# Patient Record
Sex: FEMALE | Race: BLACK OR AFRICAN AMERICAN | NOT HISPANIC OR LATINO | Employment: FULL TIME | URBAN - METROPOLITAN AREA
[De-identification: names, ages, dates, MRNs, and addresses within clinical notes are randomized per-mention and may not be internally consistent; named-entity substitution may affect disease eponyms.]

---

## 2018-02-08 ENCOUNTER — OFFICE VISIT (OUTPATIENT)
Dept: INTERNAL MEDICINE | Facility: CLINIC | Age: 33
End: 2018-02-08
Payer: COMMERCIAL

## 2018-02-08 ENCOUNTER — LAB VISIT (OUTPATIENT)
Dept: LAB | Facility: HOSPITAL | Age: 33
End: 2018-02-08
Payer: COMMERCIAL

## 2018-02-08 VITALS
WEIGHT: 293 LBS | HEART RATE: 82 BPM | HEIGHT: 63 IN | DIASTOLIC BLOOD PRESSURE: 100 MMHG | BODY MASS INDEX: 51.91 KG/M2 | SYSTOLIC BLOOD PRESSURE: 160 MMHG

## 2018-02-08 DIAGNOSIS — Z86.39 HISTORY OF DIABETES MELLITUS, TYPE II: ICD-10-CM

## 2018-02-08 DIAGNOSIS — I10 ESSENTIAL HYPERTENSION: ICD-10-CM

## 2018-02-08 DIAGNOSIS — E88.810 METABOLIC SYNDROME: Primary | ICD-10-CM

## 2018-02-08 DIAGNOSIS — E66.9 SUPER OBESITY: ICD-10-CM

## 2018-02-08 DIAGNOSIS — E88.810 METABOLIC SYNDROME: ICD-10-CM

## 2018-02-08 LAB
ALBUMIN SERPL BCP-MCNC: 3 G/DL
ALP SERPL-CCNC: 124 U/L
ALT SERPL W/O P-5'-P-CCNC: 10 U/L
ANION GAP SERPL CALC-SCNC: 7 MMOL/L
AST SERPL-CCNC: 15 U/L
BILIRUB SERPL-MCNC: 0.4 MG/DL
BUN SERPL-MCNC: 13 MG/DL
CALCIUM SERPL-MCNC: 9.5 MG/DL
CHLORIDE SERPL-SCNC: 104 MMOL/L
CHOLEST SERPL-MCNC: 183 MG/DL
CHOLEST/HDLC SERPL: 4.7 {RATIO}
CO2 SERPL-SCNC: 27 MMOL/L
CREAT SERPL-MCNC: 0.8 MG/DL
EST. GFR  (AFRICAN AMERICAN): >60 ML/MIN/1.73 M^2
EST. GFR  (NON AFRICAN AMERICAN): >60 ML/MIN/1.73 M^2
ESTIMATED AVG GLUCOSE: 177 MG/DL
GLUCOSE SERPL-MCNC: 147 MG/DL
HBA1C MFR BLD HPLC: 7.8 %
HDLC SERPL-MCNC: 39 MG/DL
HDLC SERPL: 21.3 %
LDLC SERPL CALC-MCNC: 120.8 MG/DL
NONHDLC SERPL-MCNC: 144 MG/DL
POTASSIUM SERPL-SCNC: 4.4 MMOL/L
PROT SERPL-MCNC: 7.7 G/DL
SODIUM SERPL-SCNC: 138 MMOL/L
TRIGL SERPL-MCNC: 116 MG/DL
TSH SERPL DL<=0.005 MIU/L-ACNC: 2.6 UIU/ML

## 2018-02-08 PROCEDURE — 99999 PR PBB SHADOW E&M-EST. PATIENT-LVL III: CPT | Mod: PBBFAC,,, | Performed by: STUDENT IN AN ORGANIZED HEALTH CARE EDUCATION/TRAINING PROGRAM

## 2018-02-08 PROCEDURE — 80053 COMPREHEN METABOLIC PANEL: CPT

## 2018-02-08 PROCEDURE — 80061 LIPID PANEL: CPT

## 2018-02-08 PROCEDURE — 83036 HEMOGLOBIN GLYCOSYLATED A1C: CPT

## 2018-02-08 PROCEDURE — 84443 ASSAY THYROID STIM HORMONE: CPT

## 2018-02-08 PROCEDURE — 36415 COLL VENOUS BLD VENIPUNCTURE: CPT

## 2018-02-08 PROCEDURE — 3008F BODY MASS INDEX DOCD: CPT | Mod: S$GLB,,, | Performed by: STUDENT IN AN ORGANIZED HEALTH CARE EDUCATION/TRAINING PROGRAM

## 2018-02-08 PROCEDURE — 99213 OFFICE O/P EST LOW 20 MIN: CPT | Performed by: STUDENT IN AN ORGANIZED HEALTH CARE EDUCATION/TRAINING PROGRAM

## 2018-02-08 PROCEDURE — 99204 OFFICE O/P NEW MOD 45 MIN: CPT | Mod: S$GLB,,, | Performed by: STUDENT IN AN ORGANIZED HEALTH CARE EDUCATION/TRAINING PROGRAM

## 2018-02-08 RX ORDER — AMLODIPINE BESYLATE 10 MG/1
TABLET ORAL
Qty: 30 TABLET | Refills: 11 | Status: SHIPPED | OUTPATIENT
Start: 2018-02-08 | End: 2019-03-21 | Stop reason: SDUPTHER

## 2018-02-08 RX ORDER — GLIMEPIRIDE 1 MG/1
TABLET ORAL
Qty: 30 TABLET | Refills: 11 | Status: SHIPPED | OUTPATIENT
Start: 2018-02-08 | End: 2019-03-21

## 2018-02-08 NOTE — PROGRESS NOTES
Subjective     Chief Complaint: Establish care    History of Present Illness:    Ms. Marlyn Kim is a 32 y.o. female who is here to establish care. She has HTN diagnosed at 17 years after having gestational HTN. Has been on amlodipine for few years. BP is elevated 160/100 in clinic today. States she has been out of her meds for the past week. Denies chest pain, HA, sob, palpitations, syncope.     She has DM II was diagnosed 1.5 years ago, on metformin 500 mg, BID and Amaryl 1 mg once daily. Does not check BP at home. Per patient last A1c level was 8.0. Will get labs today. Has no blurry vision, numbness/neuropathy.     Patient is super morbidly obese with BMI 52.88. With HTN and DM II meets criteria for metabolic syndrome. High risk for CVD. She has a family history of HTN, DM, heart disease from her mother, DM II from her sister at age 46 and HTN from brother who is 38. Past surgical history includes cholecystectomy and 2 c-sections. She has 5 children. 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 Patient does not smoke, occasional drinks alcohol.    Review of Systems   Constitutional: Negative for chills and fever.   HENT: Negative for congestion, sinus pain and sore throat.    Eyes: Negative for blurred vision and double vision.   Respiratory: Negative for cough, shortness of breath and wheezing.    Cardiovascular: Negative for chest pain, palpitations and leg swelling.   Gastrointestinal: Negative for abdominal pain, constipation and diarrhea.   Genitourinary: Negative for frequency, hematuria and urgency.   Musculoskeletal: Negative for back pain, myalgias and neck pain.   Skin: Negative for itching and rash.   Neurological: Negative for dizziness, focal weakness, seizures, weakness and headaches.   Psychiatric/Behavioral: Negative for depression and substance abuse.       PAST HISTORY:     Past Medical History:   Diagnosis Date    Hypertension        Past Surgical History:   Procedure Laterality Date     " SECTION      CHOLECYSTECTOMY      TUBAL LIGATION Bilateral 2015       Family History   Problem Relation Age of Onset    Diabetes Mother     Heart disease Mother     Hypertension Mother     Cancer Neg Hx     Stroke Neg Hx        Social History     Social History    Marital status:      Spouse name: N/A    Number of children: N/A    Years of education: N/A     Social History Main Topics    Smoking status: Never Smoker    Smokeless tobacco: None    Alcohol use Yes    Drug use: Unknown    Sexual activity: Yes     Partners: Male     Birth control/ protection: Surgical     Other Topics Concern    None     Social History Narrative    None       MEDICATIONS & ALLERGIES:     Current Outpatient Prescriptions on File Prior to Visit   Medication Sig    meloxicam (MOBIC) 7.5 MG tablet Take one tablet after eating twice a day for pain.    metformin (GLUCOPHAGE-XR) 500 MG 24 hr tablet TK 2 TS PO QD.    [DISCONTINUED] amlodipine (NORVASC) 10 MG tablet TK ONE T PO QD.    [DISCONTINUED] glimepiride (AMARYL) 1 MG tablet TK 1 T PO QD     Current Facility-Administered Medications on File Prior to Visit   Medication    ibuprofen tablet 600 mg       Review of patient's allergies indicates:   Allergen Reactions    Ciprofloxacin        OBJECTIVE:     Vital Signs:  Vitals:    18 1318   BP: (!) 160/100   BP Location: Left arm   Patient Position: Sitting   BP Method: Large (Manual)   Pulse: 82   Weight: 135.4 kg (298 lb 8.1 oz)   Height: 5' 3" (1.6 m)       Body mass index is 52.88 kg/m².     Physical Exam:  General:  Well developed, well nourished, no acute distress  Head: Normocephalic, atraumatic  Eyes: PERRL, EOMI, clear sclera  Throat: No posterior pharyngeal erythema or exudate, no tonsillar exudate  Neck: supple, normal ROM, no thyromegaly   CVS:  RRR, S1 and S2 normal, no murmurs, rubs, gallops, 2+ peripheral pulses  Resp:  Lungs clear to auscultation, no wheezes, rales, rhonchi, " cough  GI:  Abdomen soft, non-tender, non-distended, normoactive bowel sounds  MSK:  No muscle atrophy, cyanosis, peripheral edema   Skin:  No rashes, ulcers, erythema  Neuro:  CNII-XII grossly intact, no focal deficits noted  Psych:  Appropriate mood and affect, normal judgement    Laboratory  Lab Results   Component Value Date    WBC 9.97 06/05/2015    HGB 12.2 06/05/2015    HCT 36.3 (L) 06/05/2015    MCV 67 (L) 06/05/2015     (H) 06/05/2015     @[id number](GLU,NA,K,Cl,CO2,BUN,Creatinine,Calcium,MG)@  Lab Results   Component Value Date    INR 0.9 06/06/2012     No results found for: HGBA1C  No results for input(s): POCTGLUCOSE in the last 72 hours.    Diagnostic Results:  None    ASSESSMENT & PLAN:   Ms. Marlyn Kim is a 32 y.o. female    Metabolic syndrome: HTN, DM II, super morbid obesity meets criteria for metabolic syndrome. Increased risk for CVD. Family hx of CVD.   -     Hemoglobin A1c; Future; Expected date: 02/08/2018  -     Comprehensive metabolic panel; Future; Expected date: 02/08/2018  -     Lipid panel; Future; Expected date: 02/08/2018  -     TSH; Future; Expected date: 02/08/2018    Essential hypertension: Morbidly obese female with HTN. Also consider secondary HTN. Will check TSH for now. Will consider renal US if indicated with follow up visit.   -     amLODIPine (NORVASC) 10 MG tablet; TK ONE T PO QD.  Dispense: 30 tablet; Refill: 11  -     Comprehensive metabolic panel; Future; Expected date: 02/08/2018    Super obesity:  -     Hemoglobin A1c; Future; Expected date: 02/08/2018  -     Comprehensive metabolic panel; Future; Expected date: 02/08/2018  -     Lipid panel; Future; Expected date: 02/08/2018  -     TSH; Future; Expected date: 02/08/2018    History of diabetes mellitus, type II  -     glimepiride (AMARYL) 1 MG tablet; TK 1 T PO QD  Dispense: 30 tablet; Refill: 11  -     Hemoglobin A1c; Future; Expected date: 02/08/2018  -     Lipid panel; Future; Expected date:  02/08/2018    RTC in 6 weeks    Discussed with Dr. France  - staff attestation to follow

## 2018-02-09 RX ORDER — ATORVASTATIN CALCIUM 40 MG/1
40 TABLET, FILM COATED ORAL DAILY
Qty: 90 TABLET | Refills: 3 | Status: SHIPPED | OUTPATIENT
Start: 2018-02-09 | End: 2019-03-21 | Stop reason: SDUPTHER

## 2018-04-05 RX ORDER — METFORMIN HYDROCHLORIDE 500 MG/1
TABLET, EXTENDED RELEASE ORAL
Qty: 60 TABLET | Refills: 0 | Status: CANCELLED | OUTPATIENT
Start: 2018-04-05

## 2018-04-09 RX ORDER — METFORMIN HYDROCHLORIDE 500 MG/1
1000 TABLET, EXTENDED RELEASE ORAL DAILY
Qty: 180 TABLET | Refills: 11 | Status: SHIPPED | OUTPATIENT
Start: 2018-04-09 | End: 2019-03-21 | Stop reason: SDUPTHER

## 2019-02-11 DIAGNOSIS — I10 ESSENTIAL HYPERTENSION: ICD-10-CM

## 2019-02-11 DIAGNOSIS — Z86.39 HISTORY OF DIABETES MELLITUS, TYPE II: ICD-10-CM

## 2019-02-11 RX ORDER — AMLODIPINE BESYLATE 10 MG/1
TABLET ORAL
Qty: 30 TABLET | Refills: 0 | Status: CANCELLED | OUTPATIENT
Start: 2019-02-11

## 2019-03-13 ENCOUNTER — TELEPHONE (OUTPATIENT)
Dept: INTERNAL MEDICINE | Facility: CLINIC | Age: 34
End: 2019-03-13

## 2019-03-13 DIAGNOSIS — Z00.00 ANNUAL PHYSICAL EXAM: Primary | ICD-10-CM

## 2019-03-13 NOTE — TELEPHONE ENCOUNTER
----- Message from Morenita Tafoya MA sent at 3/13/2019  1:13 PM CDT -----  Contact: Patient   Please advise    ----- Message -----  From: Carlos Saleh  Sent: 3/13/2019  12:52 PM  To: [facility] Staff    03/21/19 Annual Physical need lab orders placed and linked   patient of Dr Zepeda    Please call an advise  Thank you

## 2019-03-13 NOTE — TELEPHONE ENCOUNTER
Labs ordered please link and have pt do 2-3 days prior to appt so we can review results at appt thanks

## 2019-03-18 ENCOUNTER — TELEPHONE (OUTPATIENT)
Dept: INTERNAL MEDICINE | Facility: CLINIC | Age: 34
End: 2019-03-18

## 2019-03-18 ENCOUNTER — LAB VISIT (OUTPATIENT)
Dept: LAB | Facility: HOSPITAL | Age: 34
End: 2019-03-18
Payer: COMMERCIAL

## 2019-03-18 DIAGNOSIS — Z00.00 ANNUAL PHYSICAL EXAM: ICD-10-CM

## 2019-03-18 LAB
ALBUMIN SERPL BCP-MCNC: 3.2 G/DL
ALP SERPL-CCNC: 115 U/L
ALT SERPL W/O P-5'-P-CCNC: 11 U/L
ANION GAP SERPL CALC-SCNC: 10 MMOL/L
ANISOCYTOSIS BLD QL SMEAR: SLIGHT
AST SERPL-CCNC: 15 U/L
BASOPHILS # BLD AUTO: 0.02 K/UL
BASOPHILS NFR BLD: 0.2 %
BILIRUB SERPL-MCNC: 0.4 MG/DL
BUN SERPL-MCNC: 12 MG/DL
CALCIUM SERPL-MCNC: 9.3 MG/DL
CHLORIDE SERPL-SCNC: 102 MMOL/L
CHOLEST SERPL-MCNC: 183 MG/DL
CHOLEST/HDLC SERPL: 4.7 {RATIO}
CO2 SERPL-SCNC: 23 MMOL/L
CREAT SERPL-MCNC: 0.9 MG/DL
DIFFERENTIAL METHOD: ABNORMAL
EOSINOPHIL # BLD AUTO: 0.1 K/UL
EOSINOPHIL NFR BLD: 1.4 %
ERYTHROCYTE [DISTWIDTH] IN BLOOD BY AUTOMATED COUNT: 17.4 %
EST. GFR  (AFRICAN AMERICAN): >60 ML/MIN/1.73 M^2
EST. GFR  (NON AFRICAN AMERICAN): >60 ML/MIN/1.73 M^2
ESTIMATED AVG GLUCOSE: 240 MG/DL
GLUCOSE SERPL-MCNC: 274 MG/DL
HBA1C MFR BLD HPLC: 10 %
HCT VFR BLD AUTO: 32.4 %
HDLC SERPL-MCNC: 39 MG/DL
HDLC SERPL: 21.3 %
HGB BLD-MCNC: 10.3 G/DL
LDLC SERPL CALC-MCNC: 120.2 MG/DL
LYMPHOCYTES # BLD AUTO: 2.3 K/UL
LYMPHOCYTES NFR BLD: 26.3 %
MCH RBC QN AUTO: 21.3 PG
MCHC RBC AUTO-ENTMCNC: 31.8 G/DL
MCV RBC AUTO: 67 FL
MONOCYTES # BLD AUTO: 0.5 K/UL
MONOCYTES NFR BLD: 5.9 %
NEUTROPHILS # BLD AUTO: 5.8 K/UL
NEUTROPHILS NFR BLD: 66.2 %
NONHDLC SERPL-MCNC: 144 MG/DL
PLATELET # BLD AUTO: 379 K/UL
PMV BLD AUTO: 9.4 FL
POTASSIUM SERPL-SCNC: 3.9 MMOL/L
PROT SERPL-MCNC: 7.5 G/DL
RBC # BLD AUTO: 4.83 M/UL
SODIUM SERPL-SCNC: 135 MMOL/L
TRIGL SERPL-MCNC: 119 MG/DL
TSH SERPL DL<=0.005 MIU/L-ACNC: 2.83 UIU/ML
WBC # BLD AUTO: 8.74 K/UL

## 2019-03-18 PROCEDURE — 80053 COMPREHEN METABOLIC PANEL: CPT

## 2019-03-18 PROCEDURE — 85025 COMPLETE CBC W/AUTO DIFF WBC: CPT

## 2019-03-18 PROCEDURE — 80061 LIPID PANEL: CPT

## 2019-03-18 PROCEDURE — 83036 HEMOGLOBIN GLYCOSYLATED A1C: CPT

## 2019-03-18 PROCEDURE — 36415 COLL VENOUS BLD VENIPUNCTURE: CPT

## 2019-03-18 PROCEDURE — 84443 ASSAY THYROID STIM HORMONE: CPT

## 2019-03-21 ENCOUNTER — OFFICE VISIT (OUTPATIENT)
Dept: INTERNAL MEDICINE | Facility: CLINIC | Age: 34
End: 2019-03-21
Payer: COMMERCIAL

## 2019-03-21 ENCOUNTER — PATIENT MESSAGE (OUTPATIENT)
Dept: ADMINISTRATIVE | Facility: OTHER | Age: 34
End: 2019-03-21

## 2019-03-21 VITALS
BODY MASS INDEX: 51.91 KG/M2 | DIASTOLIC BLOOD PRESSURE: 110 MMHG | SYSTOLIC BLOOD PRESSURE: 160 MMHG | OXYGEN SATURATION: 99 % | WEIGHT: 293 LBS | HEART RATE: 91 BPM | TEMPERATURE: 98 F | HEIGHT: 63 IN

## 2019-03-21 DIAGNOSIS — I10 ESSENTIAL HYPERTENSION: ICD-10-CM

## 2019-03-21 DIAGNOSIS — E66.9 SUPER OBESITY: ICD-10-CM

## 2019-03-21 DIAGNOSIS — E11.8 TYPE 2 DIABETES MELLITUS WITH COMPLICATION, WITH LONG-TERM CURRENT USE OF INSULIN: Primary | ICD-10-CM

## 2019-03-21 DIAGNOSIS — I15.2 HYPERTENSION ASSOCIATED WITH DIABETES: ICD-10-CM

## 2019-03-21 DIAGNOSIS — E11.59 HYPERTENSION ASSOCIATED WITH DIABETES: ICD-10-CM

## 2019-03-21 DIAGNOSIS — Z79.4 TYPE 2 DIABETES MELLITUS WITH COMPLICATION, WITH LONG-TERM CURRENT USE OF INSULIN: Primary | ICD-10-CM

## 2019-03-21 DIAGNOSIS — E11.65 UNCONTROLLED TYPE 2 DIABETES MELLITUS WITH HYPERGLYCEMIA: ICD-10-CM

## 2019-03-21 PROBLEM — K02.9 CARIES: Status: ACTIVE | Noted: 2018-03-05

## 2019-03-21 PROCEDURE — 3046F HEMOGLOBIN A1C LEVEL >9.0%: CPT | Mod: CPTII,S$GLB,, | Performed by: NURSE PRACTITIONER

## 2019-03-21 PROCEDURE — 99999 PR PBB SHADOW E&M-EST. PATIENT-LVL V: CPT | Mod: PBBFAC,,, | Performed by: NURSE PRACTITIONER

## 2019-03-21 PROCEDURE — 99214 OFFICE O/P EST MOD 30 MIN: CPT | Mod: S$GLB,,, | Performed by: NURSE PRACTITIONER

## 2019-03-21 PROCEDURE — 3008F PR BODY MASS INDEX (BMI) DOCUMENTED: ICD-10-PCS | Mod: CPTII,S$GLB,, | Performed by: NURSE PRACTITIONER

## 2019-03-21 PROCEDURE — 3077F SYST BP >= 140 MM HG: CPT | Mod: CPTII,S$GLB,, | Performed by: NURSE PRACTITIONER

## 2019-03-21 PROCEDURE — 99214 PR OFFICE/OUTPT VISIT, EST, LEVL IV, 30-39 MIN: ICD-10-PCS | Mod: S$GLB,,, | Performed by: NURSE PRACTITIONER

## 2019-03-21 PROCEDURE — 3046F PR MOST RECENT HEMOGLOBIN A1C LEVEL > 9.0%: ICD-10-PCS | Mod: CPTII,S$GLB,, | Performed by: NURSE PRACTITIONER

## 2019-03-21 PROCEDURE — 3077F PR MOST RECENT SYSTOLIC BLOOD PRESSURE >= 140 MM HG: ICD-10-PCS | Mod: CPTII,S$GLB,, | Performed by: NURSE PRACTITIONER

## 2019-03-21 PROCEDURE — 3080F DIAST BP >= 90 MM HG: CPT | Mod: CPTII,S$GLB,, | Performed by: NURSE PRACTITIONER

## 2019-03-21 PROCEDURE — 99999 PR PBB SHADOW E&M-EST. PATIENT-LVL V: ICD-10-PCS | Mod: PBBFAC,,, | Performed by: NURSE PRACTITIONER

## 2019-03-21 PROCEDURE — 3008F BODY MASS INDEX DOCD: CPT | Mod: CPTII,S$GLB,, | Performed by: NURSE PRACTITIONER

## 2019-03-21 PROCEDURE — 3080F PR MOST RECENT DIASTOLIC BLOOD PRESSURE >= 90 MM HG: ICD-10-PCS | Mod: CPTII,S$GLB,, | Performed by: NURSE PRACTITIONER

## 2019-03-21 RX ORDER — AMLODIPINE BESYLATE 10 MG/1
TABLET ORAL
Qty: 30 TABLET | Refills: 11 | Status: SHIPPED | OUTPATIENT
Start: 2019-03-21 | End: 2020-02-20

## 2019-03-21 RX ORDER — METFORMIN HYDROCHLORIDE 500 MG/1
1000 TABLET, EXTENDED RELEASE ORAL DAILY
Qty: 180 TABLET | Refills: 11 | Status: SHIPPED | OUTPATIENT
Start: 2019-03-21 | End: 2020-04-06 | Stop reason: SDUPTHER

## 2019-03-21 RX ORDER — LOSARTAN POTASSIUM 50 MG/1
50 TABLET ORAL DAILY
Qty: 90 TABLET | Refills: 3 | Status: SHIPPED | OUTPATIENT
Start: 2019-03-21 | End: 2020-02-20

## 2019-03-21 RX ORDER — GLIPIZIDE 10 MG/1
10 TABLET, FILM COATED, EXTENDED RELEASE ORAL
COMMUNITY
End: 2019-05-17

## 2019-03-21 RX ORDER — INSULIN PUMP SYRINGE, 3 ML
EACH MISCELLANEOUS
Qty: 1 EACH | Refills: 0 | Status: SHIPPED | OUTPATIENT
Start: 2019-03-21 | End: 2021-02-11 | Stop reason: SDUPTHER

## 2019-03-21 RX ORDER — ATORVASTATIN CALCIUM 40 MG/1
40 TABLET, FILM COATED ORAL DAILY
Qty: 90 TABLET | Refills: 3 | Status: SHIPPED | OUTPATIENT
Start: 2019-03-21 | End: 2019-11-19

## 2019-03-21 RX ORDER — LANCETS 28 GAUGE
EACH MISCELLANEOUS
Qty: 100 EACH | Refills: 6 | Status: SHIPPED | OUTPATIENT
Start: 2019-03-21 | End: 2020-04-06 | Stop reason: SDUPTHER

## 2019-03-21 NOTE — MEDICAL/APP STUDENT
Subjective:       Patient ID: Marlyn Kim is a 33 y.o. female.    Chief Complaint: Annual Exam      I have reviewed the notes, assessments, and/or procedures performed by SUNITA student , I concur with her/his documentation of Marlyn Kim.    Pt seen in conjunction with NP student    Patient presents today for her annual exam. Pt has no complaints today.   She admits to not monitoring her blood sugar and blood pressures like she should. States that  She doesn't like the needles to check her blood sugar with. She also has been out of her blood pressure medication and diabetic medication for the past few months. She states that she never began the atorvastatin because the pharmacy never called that they received the prescription. Reports that she does not drink enough water, mostly drinks soda. She works night shift.   Eye exam within the last year. Pt has never been to the podiatrist. Last dental exam more than a few years. Last pap smear two years ago. She did get a influenza vaccine through work, she works for [employer]. Unsure of last Tdap injection date, not interested in getting today. Pt has never had a pneumonia vaccine, not interested in getting today.       Review of Systems   Constitutional: Negative for activity change, appetite change, chills, diaphoresis, fatigue and fever.   HENT: Negative for congestion, postnasal drip, rhinorrhea, sinus pressure, sinus pain, sneezing, sore throat and tinnitus.    Eyes: Negative for photophobia, pain, discharge, redness, itching and visual disturbance.   Respiratory: Negative for cough, chest tightness, shortness of breath and stridor.    Cardiovascular: Negative for chest pain, palpitations and leg swelling.   Gastrointestinal: Negative for constipation, diarrhea, nausea and vomiting.   Endocrine: Negative for cold intolerance, heat intolerance, polydipsia, polyphagia and polyuria.   Genitourinary: Positive for vaginal discharge. Negative for difficulty  urinating, dyspareunia, dysuria, flank pain, frequency, hematuria and urgency.        Vaginal itching periodically    Musculoskeletal: Negative for arthralgias, back pain, myalgias and neck pain.   Skin: Negative for color change, pallor, rash and wound.   Allergic/Immunologic: Negative for environmental allergies, food allergies and immunocompromised state.   Neurological: Negative for dizziness, tremors, seizures, syncope, weakness, numbness and headaches.   Hematological: Negative for adenopathy.   Psychiatric/Behavioral: Negative for confusion and sleep disturbance. The patient is not nervous/anxious.        Objective:          Assessment:       1. Type 2 diabetes mellitus with complication, with long-term current use of insulin    2. Essential hypertension    3. Hypertension associated with diabetes    4. Uncontrolled type 2 diabetes mellitus with hyperglycemia    5. Super obesity        Plan:       Patient Counseling:  --Nutrition: Stressed importance of moderation in sodium/caffeine intake, saturated fat and cholesterol, caloric balance, sufficient intake of fresh fruits, vegetables, fiber, calcium, iron, and 1 mg of folate supplement per day (for females capable of pregnancy).  --Exercise: Stressed the importance of regular exercise.   --Substance Abuse: Discussed cessation/primary prevention of tobacco, alcohol, or other drug use; driving or other dangerous activities under the influence; availability of treatment for abuse.   --Sexuality: Discussed sexually transmitted diseases, partner selection, use of condoms, avoidance of unintended pregnancy and contraceptive alternatives.   --Injury prevention: Discussed safety belts, safety helmets, smoke detector.  --Dental health: Discussed importance of regular tooth brushing, flossing, and dental visits.  --Immunizations reviewed.    Marlyn MAURICIO was seen today for annual exam.    Diagnoses and all orders for this visit:    Type 2 diabetes mellitus with  complication, with long-term current use of insulin  -     Ambulatory Referral to Podiatry  -     losartan (COZAAR) 50 MG tablet; Take 1 tablet (50 mg total) by mouth once daily.  -     metFORMIN (GLUCOPHAGE-XR) 500 MG 24 hr tablet; Take 2 tablets (1,000 mg total) by mouth Daily.  -     blood-glucose meter kit; USE TO TEST BLOOD SUGAR TWICE DAILY  -     lancets 28 gauge Misc; USE TO TEST BLOOD SUGAR TWICE DAILY  -     blood sugar diagnostic Strp; USE TO TEST BLOOD SUGAR TWICE DAILY    Essential hypertension  -     amLODIPine (NORVASC) 10 MG tablet; TK ONE T PO QD.  -     Hypertension Digital Medicine (HDMP) Enrollment Order  -     Hypertension Digital Medicine (HDMP): Assign Onboarding Questionnaires    Hypertension associated with diabetes  -     atorvastatin (LIPITOR) 40 MG tablet; Take 1 tablet (40 mg total) by mouth once daily.  -     amLODIPine (NORVASC) 10 MG tablet; TK ONE T PO QD.  -     Hypertension Digital Medicine (HDMP) Enrollment Order  -     Hypertension Digital Medicine (HDMP): Assign Onboarding Questionnaires    Uncontrolled type 2 diabetes mellitus with hyperglycemia    Super obesity    Other orders  -     semaglutide (OZEMPIC) 0.25 mg or 0.5 mg(2 mg/1.5 mL) PnIj; Inject 0.25 mg into the skin every 7 days for 28 days, THEN 0.5 mg every 7 days for 28 days, THEN 1 mg every 7 days.      Patient Instructions   Goal is fasting blood sugar in am 110 or less.  Blood sugar 2 HOURS AFTER A MEAL 140 or less  All diabetics should take the following unless allergic:  Baby aspirin 81 mg one daily, available over the counter, to help prevent a stroke or heart attack  A medication called an ACE inhibitor or an ARB to protect the kidneys ( Lisinopril, Losartan)  A statin ( Zocor, Mevacor, Crestor, Lipitor) even if your cholesterol is normal to help prevent a stroke or heart attack  Flu shot every year  Eye exam ever year  Dental exam every 6 months    You should be checking your blood sugar every morning before you  eat or drink and then once during the day 2 HOURS AFTER A MEAL, it does not matter which meal.      Follow up in 2-4 weeks for your blood pressure.    I will get enrolled in the digital hypertension program

## 2019-03-21 NOTE — PATIENT INSTRUCTIONS
Goal is fasting blood sugar in am 110 or less.  Blood sugar 2 HOURS AFTER A MEAL 140 or less  All diabetics should take the following unless allergic:  Baby aspirin 81 mg one daily, available over the counter, to help prevent a stroke or heart attack  A medication called an ACE inhibitor or an ARB to protect the kidneys ( Lisinopril, Losartan)  A statin ( Zocor, Mevacor, Crestor, Lipitor) even if your cholesterol is normal to help prevent a stroke or heart attack  Flu shot every year  Eye exam ever year  Dental exam every 6 months    You should be checking your blood sugar every morning before you eat or drink and then once during the day 2 HOURS AFTER A MEAL, it does not matter which meal.      Follow up in 2-4 weeks for your blood pressure.    I will get enrolled in the digital hypertension program

## 2019-03-21 NOTE — PROGRESS NOTES
Subjective:       Patient ID: Marlyn Kim is a 33 y.o. female.    Chief Complaint: Annual Exam    HPI  Review of Systems      Past Medical History:   Diagnosis Date    Hypertension      Past Surgical History:   Procedure Laterality Date     SECTION      CHOLECYSTECTOMY      TUBAL LIGATION Bilateral 2015     Social History     Social History Narrative    Not on file     Family History   Problem Relation Age of Onset    Diabetes Mother     Heart disease Mother     Hypertension Mother     Cancer Neg Hx     Stroke Neg Hx      Outpatient Encounter Medications as of 3/21/2019   Medication Sig Dispense Refill    amLODIPine (NORVASC) 10 MG tablet TK ONE T PO QD. 30 tablet 11    metFORMIN (GLUCOPHAGE-XR) 500 MG 24 hr tablet Take 2 tablets (1,000 mg total) by mouth Daily. 180 tablet 11    [DISCONTINUED] amLODIPine (NORVASC) 10 MG tablet TK ONE T PO QD. 30 tablet 11    [DISCONTINUED] glimepiride (AMARYL) 1 MG tablet TK 1 T PO QD 30 tablet 11    [DISCONTINUED] metFORMIN (GLUCOPHAGE-XR) 500 MG 24 hr tablet Take 2 tablets (1,000 mg total) by mouth Daily. 180 tablet 11    atorvastatin (LIPITOR) 40 MG tablet Take 1 tablet (40 mg total) by mouth once daily. 90 tablet 3    blood sugar diagnostic Strp USE TO TEST BLOOD SUGAR TWICE DAILY 100 strip 6    blood-glucose meter kit USE TO TEST BLOOD SUGAR TWICE DAILY 1 each 0    glipiZIDE (GLUCOTROL) 10 MG TR24 Take 10 mg by mouth.      lancets 28 gauge Misc USE TO TEST BLOOD SUGAR TWICE DAILY 100 each 6    losartan (COZAAR) 50 MG tablet Take 1 tablet (50 mg total) by mouth once daily. 90 tablet 3    semaglutide (OZEMPIC) 0.25 mg or 0.5 mg(2 mg/1.5 mL) PnIj Inject 0.25 mg into the skin every 7 days for 28 days, THEN 0.5 mg every 7 days for 28 days, THEN 1 mg every 7 days. 3 mL 6    [DISCONTINUED] atorvastatin (LIPITOR) 40 MG tablet Take 1 tablet (40 mg total) by mouth once daily. 90 tablet 3    [DISCONTINUED] meloxicam (MOBIC) 7.5 MG tablet Take one  "tablet after eating twice a day for pain. 14 tablet 0     Facility-Administered Encounter Medications as of 3/21/2019   Medication Dose Route Frequency Provider Last Rate Last Dose    [DISCONTINUED] ibuprofen tablet 600 mg  600 mg Oral 1 time in Clinic/HOD Poli Traylor MD         Last 3 sets of Vitals  Vitals - 1 value per visit 4/7/2017 2/8/2018 3/21/2019   SYSTOLIC 140 160 160   DIASTOLIC 84 100 110   PULSE 80 82 91   TEMPERATURE - - 98   RESPIRATIONS - - -   SPO2 - - 99   Weight (lb) 301 298.5 297.18   Weight (kg) 136.533 135.4 134.8   HEIGHT 5' 3" 5' 3" 5' 3"   BODY MASS INDEX 53.32 52.88 52.64   VISIT REPORT - - -   Pain Score  - 0 0         Objective:      Physical Exam   Constitutional: She is oriented to person, place, and time. She appears well-developed and well-nourished. No distress.   HENT:   Head: Normocephalic and atraumatic.   Right Ear: External ear normal.   Left Ear: External ear normal.   Nose: Nose normal.   Mouth/Throat: Oropharynx is clear and moist. No oropharyngeal exudate.   Eyes: Conjunctivae and EOM are normal. Pupils are equal, round, and reactive to light. Right eye exhibits no discharge. No scleral icterus.   Neck: Normal range of motion. Neck supple. No JVD present. No thyromegaly present.   Cardiovascular: Normal rate, regular rhythm, normal heart sounds and intact distal pulses.   No murmur heard.  Pulses:       Dorsalis pedis pulses are 2+ on the right side, and 2+ on the left side.        Posterior tibial pulses are 2+ on the right side, and 2+ on the left side.   Pulmonary/Chest: Effort normal and breath sounds normal. No respiratory distress. She has no wheezes.   Abdominal: Soft. Bowel sounds are normal. She exhibits no distension and no mass. There is no tenderness.   Musculoskeletal: Normal range of motion. She exhibits no edema or tenderness.        Right foot: There is normal range of motion and no deformity.        Left foot: There is normal range of motion and no " deformity.   Feet:   Right Foot:   Skin Integrity: Positive for dry skin. Negative for ulcer, blister, skin breakdown, erythema, warmth or callus.   Left Foot:   Skin Integrity: Positive for dry skin. Negative for ulcer, blister, skin breakdown, erythema, warmth or callus.   Lymphadenopathy:     She has no cervical adenopathy.   Neurological: She is alert and oriented to person, place, and time. She has normal reflexes. No cranial nerve deficit. She exhibits normal muscle tone.   Skin: Skin is warm and dry. Capillary refill takes less than 2 seconds. No rash noted. She is not diaphoretic. No erythema.   Psychiatric: She has a normal mood and affect. Her behavior is normal. Judgment and thought content normal.   Nursing note and vitals reviewed.          Lab Results   Component Value Date    WBC 8.74 03/18/2019    RBC 4.83 03/18/2019    HGB 10.3 (L) 03/18/2019    HCT 32.4 (L) 03/18/2019    MCV 67 (L) 03/18/2019    MCH 21.3 (L) 03/18/2019    MCHC 31.8 (L) 03/18/2019    RDW 17.4 (H) 03/18/2019     (H) 03/18/2019    MPV 9.4 03/18/2019    GRAN 5.8 03/18/2019    GRAN 66.2 03/18/2019    LYMPH 2.3 03/18/2019    LYMPH 26.3 03/18/2019    MONO 0.5 03/18/2019    MONO 5.9 03/18/2019    EOS 0.1 03/18/2019    BASO 0.02 03/18/2019    EOSINOPHIL 1.4 03/18/2019    BASOPHIL 0.2 03/18/2019     Lab Results   Component Value Date    WBC 8.74 03/18/2019    HGB 10.3 (L) 03/18/2019    HCT 32.4 (L) 03/18/2019     (H) 03/18/2019    CHOL 183 03/18/2019    TRIG 119 03/18/2019    HDL 39 (L) 03/18/2019    ALT 11 03/18/2019    AST 15 03/18/2019     (L) 03/18/2019    K 3.9 03/18/2019     03/18/2019    CREATININE 0.9 03/18/2019    BUN 12 03/18/2019    CO2 23 03/18/2019    TSH 2.835 03/18/2019    INR 0.9 06/06/2012    HGBA1C 10.0 (H) 03/18/2019       Assessment:       1. Type 2 diabetes mellitus with complication, with long-term current use of insulin    2. Essential hypertension    3. Hypertension associated with diabetes     4. Uncontrolled type 2 diabetes mellitus with hyperglycemia    5. Super obesity        Plan:   Discussed DM management and care  Discussed daily foot checks  Discussed proper footwear      Marlyn MAURICIO was seen today for annual exam.    Diagnoses and all orders for this visit:    Type 2 diabetes mellitus with complication, with long-term current use of insulin  -     Ambulatory Referral to Podiatry  -     losartan (COZAAR) 50 MG tablet; Take 1 tablet (50 mg total) by mouth once daily.  -     metFORMIN (GLUCOPHAGE-XR) 500 MG 24 hr tablet; Take 2 tablets (1,000 mg total) by mouth Daily.  -     blood-glucose meter kit; USE TO TEST BLOOD SUGAR TWICE DAILY  -     lancets 28 gauge Misc; USE TO TEST BLOOD SUGAR TWICE DAILY  -     blood sugar diagnostic Strp; USE TO TEST BLOOD SUGAR TWICE DAILY    Essential hypertension  -     amLODIPine (NORVASC) 10 MG tablet; TK ONE T PO QD.  -     [facility] Enrollment Order  -     Hypertension Digital Medicine ([facility]): Assign Onboarding Questionnaires    Hypertension associated with diabetes  -     atorvastatin (LIPITOR) 40 MG tablet; Take 1 tablet (40 mg total) by mouth once daily.  -     amLODIPine (NORVASC) 10 MG tablet; TK ONE T PO QD.  -     Hypertension Digital Medicine ([facility]) Enrollment Order  -     Hypertension Digital Medicine ([facility]): Assign Onboarding Questionnaires    Uncontrolled type 2 diabetes mellitus with hyperglycemia    Super obesity    Other orders  -     semaglutide (OZEMPIC) 0.25 mg or 0.5 mg(2 mg/1.5 mL) PnIj; Inject 0.25 mg into the skin every 7 days for 28 days, THEN 0.5 mg every 7 days for 28 days, THEN 1 mg every 7 days.      Patient Instructions   Goal is fasting blood sugar in am 110 or less.  Blood sugar 2 HOURS AFTER A MEAL 140 or less  All diabetics should take the following unless allergic:  Baby aspirin 81 mg one daily, available over the counter, to help prevent a stroke or heart attack  A medication called an ACE inhibitor or an  ARB to protect the kidneys ( Lisinopril, Losartan)  A statin ( Zocor, Mevacor, Crestor, Lipitor) even if your cholesterol is normal to help prevent a stroke or heart attack  Flu shot every year  Eye exam ever year  Dental exam every 6 months    You should be checking your blood sugar every morning before you eat or drink and then once during the day 2 HOURS AFTER A MEAL, it does not matter which meal.      Follow up in 2-4 weeks for your blood pressure.    I will get enrolled in the digital hypertension program

## 2019-03-25 ENCOUNTER — PATIENT OUTREACH (OUTPATIENT)
Dept: OTHER | Facility: OTHER | Age: 34
End: 2019-03-25

## 2019-03-25 NOTE — LETTER
March 29, 2019     Marlyn Kim  [address] [address]       Dear aMrlyn MAURICIO,    [facility] Our goal is to make care effective, proactive and convenient by using data you send us from home to better treat your chronic conditions.        My name is eYsy Byrd, and I am your dedicated Digital Medicine clinician. As an expert in medication management, I will help ensure that the medications you are taking continue to provide the intended benefits and help you reach your goals. You can reach me directly at [phone] or by sending me a message directly through your [facility] account.      I am Cynthia Raygoza and I will be your health . My job is to help you identify lifestyle changes to improve your disease control. We will talk about nutrition, exercise, and other ways you may be able to adjust your current habits to better your health. Additionally, we will help ensure you are completing the tests and screenings that are necessary to help manage your conditions. You can reach me directly at [phone] or by sending me a message directly through your [facility] account.    Most importantly, YOU are at the center of this team. Together, we will work to improve your overall health and encourage you to meet your goals for a healthier lifestyle.     What we expect from YOU:  · Please take frequent home blood pressure measurements. We ask that you take at least 1 blood pressure reading per week, but more information will better help us get you know you. Be sure you rest for a few minutes before taking the reading in a quiet, comfortable place.     Be available to receive phone calls or [facility] messages, when appropriate, from your care team. Please let us know if there are any specific days or times that work best for us to reach you via phone.     Complete routine tests and screenings. Dont worry, we will help keep you on track!           What you should  expect from your Digital Medicine Care Team:   We will work with you to create a personalized plan of care and provide you with encouragement and education, including regarding lifestyle changes, that could help you manage your disease states.     We will adjust your current medications, if needed, and continue to monitor your long-term progress.     We will provide you and your physician with monthly progress reports after you have been in the program for more than 30 days.     We will send you reminders through [facility] and text messages to help ensure you do not miss any testing deadlines to help manage your disease states.    You will be able to reach us by phone or through your [facility] account by clicking our names under Care Team on the right side of the home screen.    I look forward to working with you to achieve your blood pressure goals!    We look forward to working with you to help manage your health,    Sincerely,    Your Digital Medicine Team    Please visit our websites to learn more:   · Hypertension: [email or web address]      Remember, we are not available for emergencies. If you have an emergency, please contact your doctors office directly or call [facility] ([phone] or [phone]) or [phone].

## 2019-03-25 NOTE — PROGRESS NOTES
Last 5 Patient Entered Readings                                      Current 30 Day Average: 178/111     Recent Readings 3/24/2019 3/23/2019 3/22/2019 3/21/2019    SBP (mmHg) 131 151 215 213    DBP (mmHg) 87 101 121 134    Pulse 127 95 77 100          Digital Medicine: Health  Introduction Call     Left voicemail and requested call back in order to complete Digital Medicine health  introduction call.

## 2019-03-26 ENCOUNTER — PATIENT MESSAGE (OUTPATIENT)
Dept: ADMINISTRATIVE | Facility: OTHER | Age: 34
End: 2019-03-26

## 2019-04-02 ENCOUNTER — TELEPHONE (OUTPATIENT)
Dept: PHARMACY | Facility: CLINIC | Age: 34
End: 2019-04-02

## 2019-04-02 DIAGNOSIS — E11.65 UNCONTROLLED TYPE 2 DIABETES MELLITUS WITH HYPERGLYCEMIA: Primary | ICD-10-CM

## 2019-04-02 NOTE — TELEPHONE ENCOUNTER
Good Morning.       The medication sent to the [facility] (Ozempic) for Ms. Kim is excluded from her [facility] [employer] insurance coverage.  The drug is not formulary and an alternative medication will be required to proceed with treatment.  The [facility] [employer] insurance provider prefers Trulicity, Byetta, and/or Bydureon with a few step therapy requirements.     The patient has been notified and is aware of the medication approval.    If a drug change is appropriate or if there are any additional questions or concerns about the prescription please contact the pharmacy at, [phone].    Thanks.    Oly Brar CPhT.  Patient Care Advocate  [facility]  [email or web address]

## 2019-04-08 RX ORDER — GLIMEPIRIDE 1 MG/1
TABLET ORAL
Qty: 30 TABLET | Refills: 0 | OUTPATIENT
Start: 2019-04-08

## 2019-04-12 ENCOUNTER — PATIENT OUTREACH (OUTPATIENT)
Dept: OTHER | Facility: OTHER | Age: 34
End: 2019-04-12

## 2019-04-12 NOTE — PROGRESS NOTES
Last 5 Patient Entered Readings                                      Current 30 Day Average: 152/96     Recent Readings 4/11/2019 4/7/2019 4/4/2019 4/3/2019 4/1/2019    SBP (mmHg) 134 153 151 131 141    DBP (mmHg) 83 94 90 91 91    Pulse 88 92 91 101 87        Hypertension Medications     amLODIPine (NORVASC) 10 MG tablet TK ONE T PO QD.    losartan (COZAAR) 50 MG tablet Take 1 tablet (50 mg total) by mouth once daily.     Called patient for digital medicine clinical pharmacist introduction. No answer. Left message for call back    Yesy Salinas, Pharm.D.  [facility] Digital Medicine Clinical Pharmacist  [phone]

## 2019-04-18 NOTE — PROGRESS NOTES
Last 5 Patient Entered Readings                                      Current 30 Day Average: 148/95     Recent Readings 4/15/2019 4/12/2019 4/11/2019 4/7/2019 4/4/2019    SBP (mmHg) 126 126 134 153 151    DBP (mmHg) 90 90 83 94 90    Pulse 112 96 88 92 91        Called patient for digital medicine clinical pharmacist introduction. No answer. Left message for call back. Will send my chart message.     Yesy Salinas, Pharm.D.   Digital Medicine Clinical Pharmacist  [phone]

## 2019-04-26 ENCOUNTER — PATIENT OUTREACH (OUTPATIENT)
Dept: OTHER | Facility: OTHER | Age: 34
End: 2019-04-26

## 2019-04-26 NOTE — LETTER
October 10, 2019     Marlyn Kim  [address] [id number]       Dear aMrlyn MAURICIO,    We have made several attempts to encourage your participation in [facility]. Unfortunately, we have been unsuccessful.     This is an official notice that you are no longer enrolled in the digital medicine program, and thus, we will no longer be managing your disease states. Please note this has no impact on your relationship with [facility] or your providers. Going forward, please reach out to your primary care provider with any questions or concerns regarding your health.    Please contact [phone] if you have any additional questions.    Sincerely,  The [facility] Digital [facility] Team

## 2019-04-26 NOTE — PROGRESS NOTES
Last 5 Patient Entered Readings                                      Current 30 Day Average: 138/90     Recent Readings 4/25/2019 4/19/2019 4/15/2019 4/12/2019 4/11/2019    SBP (mmHg) 157 120 126 126 134    DBP (mmHg) 101 80 90 90 83    Pulse 79 87 112 96 88          Digital Medicine: Health  Introduction Call     Left voicemail and requested call back in order to complete Digital Medicine health  introduction call.

## 2019-04-26 NOTE — LETTER
October 10, 2019     Marlyn Kim  [address] [id number]       Dear Marlyn MAURICIO,    We have made several attempts to encourage your participation in [facility]. Unfortunately, we have been unsuccessful.     This is an official notice that you are no longer enrolled in the digital medicine program, and thus, we will no longer be managing your disease states. Please note this has no impact on your relationship with [facility] or your providers. Going forward, please reach out to your primary care provider with any questions or concerns regarding your health.    Please contact [phone] if you have any additional questions.    Sincerely,  The [facility] Digital [facility] Team

## 2019-04-26 NOTE — LETTER
September 12, 2019     Marlyn Kim  [address] [address]       Dear Marlyn MAURICIO,    Thank you for enrolling in [facility]. To participate, we ask that you submit information at least once weekly through your [facility] account and maintain regular contact with your Care Team. We have not received any data or heard from you in some time.     The Digital Medicine Care Team has attempted to reach you on multiple occasions to determine if you would like to continue participating in the program. While we encourage you to continue participating fully, we understand that circumstances may change.     To continue participating in the program, please contact me at [phone]. If we do not hear back, you will be un-enrolled, and your physician will be notified of your decision.    If you have submitted data and believe you are receiving this letter in error, please call the Digital Medicine Patient Support Line at [phone] for troubleshooting.      We look forward to hearing from you soon.    Sincerely,     Cynthia Raygoza  [facility]

## 2019-04-26 NOTE — LETTER
September 12, 2019     Marlyn Kim  [address] [address]       Dear Marlyn MAURICIO,    Thank you for enrolling in [facility]. To participate, we ask that you submit information at least once weekly through your [facility] account and maintain regular contact with your Care Team. We have not received any data or heard from you in some time.     The Digital Medicine Care Team has attempted to reach you on multiple occasions to determine if you would like to continue participating in the program. While we encourage you to continue participating fully, we understand that circumstances may change.     To continue participating in the program, please contact me at [phone]. If we do not hear back, you will be un-enrolled, and your physician will be notified of your decision.    If you have submitted data and believe you are receiving this letter in error, please call the Digital Medicine Patient Support Line at [phone] for troubleshooting.      We look forward to hearing from you soon.    Sincerely,     Cynthia Raygoza  [facility]

## 2019-05-10 NOTE — PROGRESS NOTES
Last 5 Patient Entered Readings                                      Current 30 Day Average: 136/90     Recent Readings 5/9/2019 5/8/2019 5/3/2019 4/29/2019 4/27/2019    SBP (mmHg) 130 129 154 133 146    DBP (mmHg) 87 80 105 94 94    Pulse 88 95 99 102 81          Pushing patient back due to clinician starting non-compliance.

## 2019-05-10 NOTE — PROGRESS NOTES
Last 5 Patient Entered Readings                                      Current 30 Day Average: 136/90     Recent Readings 5/9/2019 5/8/2019 5/3/2019 4/29/2019 4/27/2019    SBP (mmHg) 130 129 154 133 146    DBP (mmHg) 87 80 105 94 94    Pulse 88 95 99 102 81          Called patient for digital medicine clinical pharmacist introduction. No answer. Left message for call back      Yesy Salinas, Pharm.D.   Digital Medicine Clinical Pharmacist  [phone]

## 2019-05-17 NOTE — PROGRESS NOTES
Last 5 Patient Entered Readings                                      Current 30 Day Average: 138/91     Recent Readings 5/11/2019 5/9/2019 5/8/2019 5/3/2019 4/29/2019    SBP (mmHg) 134 130 129 154 133    DBP (mmHg) 88 87 80 105 94    Pulse 108 88 95 99 102        Hypertension Medications     amLODIPine (NORVASC) 10 MG tablet TK ONE T PO QD.    losartan (COZAAR) 50 MG tablet Take 1 tablet (50 mg total) by mouth once daily.     Called patient for digital medicine clinical pharmacist introduction. She is doing well today with no complaints. Patient works over night and sometimes she sleeps later than others. She says this is why she missed my first few calls. She reports having a tubal ligation and has no intentions of pregnancy. She reports taking amlodipine and losartan in the mornings and reports correct BP monitoring technique.     Depression/ LAURIE- patient has not yet completed on boarding questionnaire.     1) BP exceeds goal of <130/<80. Continue current BP medications.   2) Discussed proper BP monitoring technique and BP goal  3) Reviewed allergies and current medications  4) Patient knows to contact me with any non-urgent clinical changes or concerns. Go to ED or call [facility] on Call for emergencies.   5) Will defer lifestyle counseling to digital medicine health . Recommended lifestyle changes.   6) Will continue to follow up.     Yesy Salinas, Pharm.D.   Digital Medicine Clinical Pharmacist  [phone]

## 2019-06-03 NOTE — PROGRESS NOTES
Last 5 Patient Entered Readings                                      Current 30 Day Average: 135/89     Recent Readings 5/28/2019 5/18/2019 5/11/2019 5/9/2019 5/8/2019    SBP (mmHg) 136 126 134 130 129    DBP (mmHg) 93 81 88 87 80    Pulse 93 98 108 88 95          Patient spoke with PharmD.     Digital Medicine: Health  Introduction Call     Left voicemail and requested call back in order to complete Digital Medicine health  introduction call.

## 2019-06-20 NOTE — PROGRESS NOTES
Last 5 Patient Entered Readings                                      Current 30 Day Average: 137/92     Recent Readings 6/7/2019 5/28/2019 5/18/2019 5/11/2019 5/9/2019    SBP (mmHg) 138 136 126 134 130    DBP (mmHg) 91 93 81 88 87    Pulse 100 93 98 108 88          Digital Medicine: Health  Introduction Call     Left voicemail and requested call back in order to complete Digital Medicine health  introduction call.   Calling to encourage more readings.

## 2019-07-02 NOTE — PROGRESS NOTES
Last 5 Patient Entered Readings                                      Current 30 Day Average: 138/91     Recent Readings 6/7/2019 5/28/2019 5/18/2019 5/11/2019 5/9/2019    SBP (mmHg) 138 136 126 134 130    DBP (mmHg) 91 93 81 88 87    Pulse 100 93 98 108 88          Patient is not submitting frequent BP readings. Health  is addressing lack of BP readings. Will push back outreach date.       Yesy Salinas, Pharm.D.   Digital Medicine Clinical Pharmacist  [phone]

## 2019-07-11 NOTE — PROGRESS NOTES
Last 5 Patient Entered Readings                                      Current 30 Day Average:      Recent Readings 6/7/2019 5/28/2019 5/18/2019 5/11/2019 5/9/2019    SBP (mmHg) 138 136 126 134 130    DBP (mmHg) 91 93 81 88 87    Pulse 100 93 98 108 88          Digital Medicine: Health  Introduction Call     Left voicemail and requested call back in order to complete Digital Medicine health  introduction call.   Calling to encourage more readings   Sending message via [employer].

## 2019-07-31 NOTE — PROGRESS NOTES
Last 5 Patient Entered Readings                                      Current 30 Day Average:      Recent Readings 6/7/2019 5/28/2019 5/18/2019 5/11/2019 5/9/2019    SBP (mmHg) 138 136 126 134 130    DBP (mmHg) 91 93 81 88 87    Pulse 100 93 98 108 88          Digital Medicine: Health  Introduction Call     Left voicemail and requested call back in order to complete Digital Medicine health  introduction call.   Calling to encourage more readings.   Patient has not read [employer] message.

## 2019-08-29 NOTE — PROGRESS NOTES
Last 5 Patient Entered Readings                                      Current 30 Day Average:      Recent Readings 6/7/2019 5/28/2019 5/18/2019 5/11/2019 5/9/2019    SBP (mmHg) 138 136 126 134 130    DBP (mmHg) 91 93 81 88 87    Pulse 100 93 98 108 88        Digital Medicine: Health  Introduction Call     Left voicemail and requested call back in order to complete Digital Medicine health  introduction call.   Calling to encourage more readings.   Patient has read [employer] message on 8/23/2019, no response.

## 2019-09-01 ENCOUNTER — HOSPITAL ENCOUNTER (EMERGENCY)
Facility: HOSPITAL | Age: 34
Discharge: HOME | End: 2019-09-01
Attending: EMERGENCY MEDICINE
Payer: COMMERCIAL

## 2019-09-01 VITALS
BODY MASS INDEX: 50.02 KG/M2 | HEIGHT: 64 IN | TEMPERATURE: 98 F | SYSTOLIC BLOOD PRESSURE: 142 MMHG | WEIGHT: 293 LBS | OXYGEN SATURATION: 99 % | DIASTOLIC BLOOD PRESSURE: 84 MMHG | RESPIRATION RATE: 20 BRPM | HEART RATE: 89 BPM

## 2019-09-01 DIAGNOSIS — J02.9 PHARYNGITIS, UNSPECIFIED ETIOLOGY: Primary | ICD-10-CM

## 2019-09-01 DIAGNOSIS — R73.9 HYPERGLYCEMIA: ICD-10-CM

## 2019-09-01 LAB
B-HCG UR QL: NEGATIVE
CTP QC/QA: YES
CTP QC/QA: YES
POCT GLUCOSE: 211 MG/DL (ref 70–110)
S PYO RRNA THROAT QL PROBE: NEGATIVE

## 2019-09-01 PROCEDURE — 87081 CULTURE SCREEN ONLY: CPT

## 2019-09-01 PROCEDURE — 96372 THER/PROPH/DIAG INJ SC/IM: CPT | Mod: ER

## 2019-09-01 PROCEDURE — 63600175 PHARM REV CODE 636 W HCPCS: Mod: ER | Performed by: EMERGENCY MEDICINE

## 2019-09-01 PROCEDURE — 99284 EMERGENCY DEPT VISIT MOD MDM: CPT | Mod: 25,ER

## 2019-09-01 PROCEDURE — 87147 CULTURE TYPE IMMUNOLOGIC: CPT

## 2019-09-01 PROCEDURE — 87880 STREP A ASSAY W/OPTIC: CPT | Mod: ER

## 2019-09-01 PROCEDURE — 81025 URINE PREGNANCY TEST: CPT | Mod: ER | Performed by: EMERGENCY MEDICINE

## 2019-09-01 RX ORDER — KETOROLAC TROMETHAMINE 30 MG/ML
60 INJECTION, SOLUTION INTRAMUSCULAR; INTRAVENOUS
Status: COMPLETED | OUTPATIENT
Start: 2019-09-01 | End: 2019-09-01

## 2019-09-01 RX ORDER — AZITHROMYCIN 250 MG/1
TABLET, FILM COATED ORAL
Qty: 6 TABLET | Refills: 0 | Status: SHIPPED | OUTPATIENT
Start: 2019-09-01 | End: 2019-11-19

## 2019-09-01 RX ADMIN — KETOROLAC TROMETHAMINE 60 MG: 30 INJECTION, SOLUTION INTRAMUSCULAR at 08:09

## 2019-09-01 NOTE — ED PROVIDER NOTES
Encounter Date: 2019    SCRIBE #1 NOTE: I, Geovany Hoang, am scribing for, and in the presence of,  Dr. Trammell. I have scribed the following portions of the note - Other sections scribed: HPI, ROS, PE.       History     Chief Complaint   Patient presents with    Sore Throat     x 2 days     33 year old female with sore throat worsening over past 2 days. Pain worsens with swallowing. Patient experiencing body aches, chills, and headache. She denies congestion, coughing, fever, nausea, or vomiting. Patient taking Ibuprofen for pain with no relief. Patient is a diabetic.    The history is provided by the patient. No  was used.     Review of patient's allergies indicates:   Allergen Reactions    Ciprofloxacin      Past Medical History:   Diagnosis Date    Diabetes mellitus     Hypertension      Past Surgical History:   Procedure Laterality Date     SECTION      CHOLECYSTECTOMY      TUBAL LIGATION Bilateral 2015     Family History   Problem Relation Age of Onset    Diabetes Mother     Heart disease Mother     Hypertension Mother     Cancer Neg Hx     Stroke Neg Hx      Social History     Tobacco Use    Smoking status: Never Smoker   Substance Use Topics    Alcohol use: Yes    Drug use: Not on file     Review of Systems   Constitutional: Positive for chills. Negative for fever.   HENT: Positive for sore throat and trouble swallowing. Negative for congestion.         Pain with swallowing   Respiratory: Negative for cough and shortness of breath.    Cardiovascular: Negative for chest pain.   Gastrointestinal: Negative for abdominal pain, diarrhea, nausea and vomiting.   Musculoskeletal: Positive for myalgias.   Skin: Negative for rash.   Neurological: Positive for headaches. Negative for dizziness and weakness.       Physical Exam     Initial Vitals [19 0802]   BP Pulse Resp Temp SpO2   (!) 148/94 99 19 97.9 °F (36.6 °C) 99 %      MAP       --          Physical Exam    Nursing note and vitals reviewed.  Constitutional: She appears well-developed and well-nourished.   HENT:   Head: Normocephalic and atraumatic.   Right Ear: External ear normal.   Left Ear: External ear normal.   Mouth/Throat: Mucous membranes are normal. Oropharyngeal exudate and posterior oropharyngeal erythema present.   Eyes: Conjunctivae and EOM are normal. Pupils are equal, round, and reactive to light.   Neck: Neck supple.   Cardiovascular: Normal rate, regular rhythm, normal heart sounds and intact distal pulses. Exam reveals no gallop and no friction rub.    No murmur heard.  Pulmonary/Chest: Breath sounds normal. No respiratory distress. She has no wheezes. She has no rhonchi. She has no rales.   Abdominal: Soft. There is no tenderness.   Neurological: She is alert and oriented to person, place, and time.   Skin: Skin is warm and dry.   Psychiatric: She has a normal mood and affect.         ED Course   Procedures  Labs Reviewed   POCT GLUCOSE - Abnormal; Notable for the following components:       Result Value    POCT Glucose 211 (*)     All other components within normal limits   CULTURE, STREP A,  THROAT   POCT RAPID STREP A   POCT URINE PREGNANCY          Imaging Results    None          Medical Decision Making:   History:   Old Medical Records: I decided to obtain old medical records.  Initial Assessment:   33 year old female with sore throat x 2 days. Patient has body aches, chills, and headaches. Physical exam is significant for oropharyngeal exudate and erythema.  Clinical Tests:   Lab Tests: Ordered and Reviewed  The following lab test(s) were unremarkable: UPT  ED Management:  Will order Strep A culture, POCT glucose, POCT rapid strep A, and POCT urine pregnancy.  Will treat with ketorolac injection 60 mg.  Strep was negative. However secondary to the exam in the patient being diabetic she will be discharged on antibiotics.            Scribe Attestation:   Scribe #1: I  performed the above scribed service and the documentation accurately describes the services I performed. I attest to the accuracy of the note.       I, Dr. Shavon Trammell, personally performed the services described in this documentation. All medical record entries made by the scribe were at my direction and in my presence.  I have reviewed the chart and agree that the record reflects my personal performance and is accurate and complete. Shavon Trammell MD.  12:04 PM 09/01/2019             Clinical Impression:     1. Pharyngitis, unspecified etiology    2. Hyperglycemia                                   Shavon Trammell MD  09/01/19 0545

## 2019-09-02 LAB — BACTERIA THROAT CULT: ABNORMAL

## 2019-09-05 NOTE — PROGRESS NOTES
Last 5 Patient Entered Readings                                      Current 30 Day Average: 117/84     Recent Readings 9/2/2019 6/7/2019 5/28/2019 5/18/2019 5/11/2019    SBP (mmHg) 117 138 136 126 134    DBP (mmHg) 84 91 93 81 88    Pulse 94 100 93 98 108          Unable to leave voicemail.   Sending enrolling provider message.

## 2019-11-01 NOTE — PROGRESS NOTES
Discharging patient from Digital Medicine program due to non-compliance.     Notified Dr. John and PharmD.

## 2019-11-17 RX ORDER — METFORMIN HYDROCHLORIDE 500 MG/1
TABLET, EXTENDED RELEASE ORAL
Qty: 180 TABLET | Refills: 0 | Status: SHIPPED | OUTPATIENT
Start: 2019-11-17 | End: 2019-11-19 | Stop reason: SDUPTHER

## 2019-11-19 ENCOUNTER — OFFICE VISIT (OUTPATIENT)
Dept: BARIATRICS | Facility: CLINIC | Age: 34
End: 2019-11-19
Payer: COMMERCIAL

## 2019-11-19 ENCOUNTER — HOSPITAL ENCOUNTER (OUTPATIENT)
Dept: CARDIOLOGY | Facility: CLINIC | Age: 34
Discharge: HOME | End: 2019-11-19
Attending: NURSE PRACTITIONER
Payer: COMMERCIAL

## 2019-11-19 ENCOUNTER — CLINICAL SUPPORT (OUTPATIENT)
Dept: BARIATRICS | Facility: CLINIC | Age: 34
End: 2019-11-19
Payer: COMMERCIAL

## 2019-11-19 ENCOUNTER — HOSPITAL ENCOUNTER (OUTPATIENT)
Dept: RADIOLOGY | Facility: HOSPITAL | Age: 34
Discharge: HOME | End: 2019-11-19
Attending: NURSE PRACTITIONER
Payer: COMMERCIAL

## 2019-11-19 VITALS
BODY MASS INDEX: 48.15 KG/M2 | SYSTOLIC BLOOD PRESSURE: 116 MMHG | WEIGHT: 289 LBS | HEIGHT: 65 IN | HEART RATE: 96 BPM | DIASTOLIC BLOOD PRESSURE: 78 MMHG

## 2019-11-19 DIAGNOSIS — I15.2 HYPERTENSION DUE TO ENDOCRINE DISORDER: ICD-10-CM

## 2019-11-19 DIAGNOSIS — E11.65 UNCONTROLLED TYPE 2 DIABETES MELLITUS WITH HYPERGLYCEMIA: ICD-10-CM

## 2019-11-19 DIAGNOSIS — E66.01 MORBID OBESITY WITH BMI OF 45.0-49.9, ADULT: ICD-10-CM

## 2019-11-19 DIAGNOSIS — E66.01 MORBID OBESITY WITH BMI OF 45.0-49.9, ADULT: Primary | ICD-10-CM

## 2019-11-19 PROCEDURE — 99499 NO LOS: ICD-10-PCS | Mod: S$GLB,,, | Performed by: DIETITIAN, REGISTERED

## 2019-11-19 PROCEDURE — 3046F PR MOST RECENT HEMOGLOBIN A1C LEVEL > 9.0%: ICD-10-PCS | Mod: CPTII,S$GLB,, | Performed by: NURSE PRACTITIONER

## 2019-11-19 PROCEDURE — 3008F PR BODY MASS INDEX (BMI) DOCUMENTED: ICD-10-PCS | Mod: CPTII,S$GLB,, | Performed by: NURSE PRACTITIONER

## 2019-11-19 PROCEDURE — 3046F HEMOGLOBIN A1C LEVEL >9.0%: CPT | Mod: CPTII,S$GLB,, | Performed by: NURSE PRACTITIONER

## 2019-11-19 PROCEDURE — 99205 OFFICE O/P NEW HI 60 MIN: CPT | Mod: S$GLB,,, | Performed by: NURSE PRACTITIONER

## 2019-11-19 PROCEDURE — 93000 ELECTROCARDIOGRAM COMPLETE: CPT | Mod: S$GLB,,, | Performed by: INTERNAL MEDICINE

## 2019-11-19 PROCEDURE — 3074F PR MOST RECENT SYSTOLIC BLOOD PRESSURE < 130 MM HG: ICD-10-PCS | Mod: CPTII,S$GLB,, | Performed by: NURSE PRACTITIONER

## 2019-11-19 PROCEDURE — 3074F SYST BP LT 130 MM HG: CPT | Mod: CPTII,S$GLB,, | Performed by: NURSE PRACTITIONER

## 2019-11-19 PROCEDURE — 99205 PR OFFICE/OUTPT VISIT, NEW, LEVL V, 60-74 MIN: ICD-10-PCS | Mod: S$GLB,,, | Performed by: NURSE PRACTITIONER

## 2019-11-19 PROCEDURE — 71046 X-RAY EXAM CHEST 2 VIEWS: CPT | Mod: 26,,, | Performed by: RADIOLOGY

## 2019-11-19 PROCEDURE — 99999 PR PBB SHADOW E&M-EST. PATIENT-LVL II: CPT | Mod: PBBFAC,,, | Performed by: DIETITIAN, REGISTERED

## 2019-11-19 PROCEDURE — 99999 PR PBB SHADOW E&M-EST. PATIENT-LVL IV: ICD-10-PCS | Mod: PBBFAC,,, | Performed by: NURSE PRACTITIONER

## 2019-11-19 PROCEDURE — 3008F BODY MASS INDEX DOCD: CPT | Mod: CPTII,S$GLB,, | Performed by: NURSE PRACTITIONER

## 2019-11-19 PROCEDURE — 71046 XR CHEST PA AND LATERAL: ICD-10-PCS | Mod: 26,,, | Performed by: RADIOLOGY

## 2019-11-19 PROCEDURE — 93000 EKG 12-LEAD: ICD-10-PCS | Mod: S$GLB,,, | Performed by: INTERNAL MEDICINE

## 2019-11-19 PROCEDURE — 99999 PR PBB SHADOW E&M-EST. PATIENT-LVL IV: CPT | Mod: PBBFAC,,, | Performed by: NURSE PRACTITIONER

## 2019-11-19 PROCEDURE — 3078F PR MOST RECENT DIASTOLIC BLOOD PRESSURE < 80 MM HG: ICD-10-PCS | Mod: CPTII,S$GLB,, | Performed by: NURSE PRACTITIONER

## 2019-11-19 PROCEDURE — 71046 X-RAY EXAM CHEST 2 VIEWS: CPT | Mod: TC,FY

## 2019-11-19 PROCEDURE — 3078F DIAST BP <80 MM HG: CPT | Mod: CPTII,S$GLB,, | Performed by: NURSE PRACTITIONER

## 2019-11-19 PROCEDURE — 99999 PR PBB SHADOW E&M-EST. PATIENT-LVL II: ICD-10-PCS | Mod: PBBFAC,,, | Performed by: DIETITIAN, REGISTERED

## 2019-11-19 PROCEDURE — 99499 UNLISTED E&M SERVICE: CPT | Mod: S$GLB,,, | Performed by: DIETITIAN, REGISTERED

## 2019-11-19 NOTE — PATIENT INSTRUCTIONS
Prior to surgery you will need to complete:  1. Chest X-Ray and EKG  2. Psychological Consult and Bariatric Dietician Consult  3. Bariatric Labs.  4. Quit smoking.  Please call the office when you have smoked your last cigarette and you will be scheduled 1 month later to check your nicotine levels.    5. A1c < 8.5% for surgery  6. Short MMPI.  - Psychological evaluation, Please call psychiatry [phone] to schedule      In preparation for bariatric surgery, please complete the following:   · Discuss your current medications with your primary care provider, remember your medications will need to be crushed, chewable, or in liquid form for the first 2-4 weeks after a gastric bypass or sleeve.  For a gastric band, your medications will need to be crushed indefinitely.    · If you take a blood thinner such as: Coumadin (warfarin), Pradaxa (dabigatran), or Plavix (clopidogrel), you will need to speak with your prescribing provider on how or if this medication can be stopped before surgery.   · If you take a medication for depression or anxiety, you will need to begin crushing or opening the capsule 1-3 months prior to surgery.  Remember to discuss this with the psychologist or psychiatrist that you see.   · If you take medication for arthritis on a daily basis that is considered a non-steroidal anti-inflammatory (NSAID), please discuss with your prescribing physician an alternative medication.  After having gastric bypass or gastric sleeve, this group of medications is not appropriate to take due to increased risk of bleeding stomach ulcers.      DEFINITIONS  Appointments: Pre-scheduled meetings or consultations with any physician, advanced practice provider, dietitian, or psychologist, and labs, imaging studies, sleep studies, etc.   Late cancellation: Cancelling an appointment 24-48 hours prior to scheduled time.  No-Show appointment:  is when    You do NOT arrive to your appointment at the time its  scheduled.   You call to cancel or cancel via [employer] less than 24 hours in advance of your scheduled appointment   You show up 15 minutes AFTER your scheduled appointment time without any notification of being late.     POLICY  1. You are allowed up to 3 cancellations for appointments.    After 3 cancellations your case will be placed on hold for 2 months and after that time you can resume the program.   2. You are allowed only 1 no-show for an appointment.    You will be re-scheduled one time and if there is a 2nd no-show at any point, your case will be placed on hold for 3 months.  After 3 months you can resume the program.     3. Upon resuming the program after being placed on hold for either above mentioned reasons, if you have a late cancel or no show for any appointment, the bariatric team will review if youre an appropriate candidate for surgery at the monthly meeting.

## 2019-11-19 NOTE — PATIENT INSTRUCTIONS
Bariatric Diet Grocery List      High in Protein:   ? Canned tuna or chicken (packed in water)  ? Lean ground turkey breast or ground round  ? Turkey or chicken (no skin)  ? Lean pork or beef   ? Scrambled, poached, or boiled eggs  ? Baked or broiled fish and seafood (not fried!)  ? Beans, edamame and lentils  ? Low fat deli meats: turkey, chicken, ham, roast beef  ? 1% or Skim Milk, Lactaid, or Soymilk  ? Low-fat cheese, cottage cheese, mozzarella string cheese, ricotta  ? Light yogurt, FF/SF frozen yogurt, custard, SF pudding  ? Protein drinks and protein bars with 0-4 grams sugar     Fruits, Vegetables and Snacks   - Green beans, broccoli, cauliflower, spinach, asparagus, carrots, lima beans, yellow squash, zucchini, etc.  - Apple, pineapple, peach, grapes, banana, watermelon, oranges, etc.  - Fruit canned in its own juice or in water (not in syrup)  - Raw veggies  - Lettuce: dark greens like spinach and Osiel  - Unsalted Nuts  - [employer] Links beef jerky     Fluids:   Skim/1% milk, Lactaid, Soymilk  Sugar-free beverages  (decaf and non-carbonated)  Decaf coffee & decaf tea   Water     1200- 1500 calorie Sample meal plan   80-120g protein per day.   Protein drinks and bars: 0-4 grams sugar   Drink lots of water throughout the day and exercise!   MENU # 1   Breakfast: 2 eggs, 1 turkey sausage jude, 1 apple   Snack: P3 protein pack  Lunch: 2 roll-ups (sliced turkey, low-fat sliced cheese, mustard), 12 baby carrots dipped in 1 Tbsp natural peanut butter   Mid-Day Snack: ¼ cup unsalted almonds, ½ cup fruit   Dinner: 1 chicken thigh simmered in tomato sauce + 2 Tbsp mozzarella cheese, ½ cup black beans, 1/2 cup steamed carrots   Evening Snack: 1/2 cup grapes with 4 cubes low-fat cheddar cheese   ___________________________________________________   MENU # 2   Breakfast: 200 calorie protein drink   Mid-morning snack : ¼ cup unsalted nuts, medium banana   Lunch: 3oz tuna or chicken salad made with 2 tbsp light rajan, over  salad with tomatoes and cucumbers.   Snack: low-fat cheese stick   Dinner: 3oz hamburger jude, slice of low-fat cheese, 1 cup yellow squash and zucchini sauteed in nonstick cookspray  Snack: 6oz light yogurt   _______________________________________________________   Menu #3   Breakfast: 6oz plain Greek yogurt + fruit (½ banana OR ½ cup fruit - pineapple, blueberries, strawberries, peach), may add Splenda to steph.   Lunch: Grilled chicken breast w/ slice low-fat pepper kiara cheese, 1/2 cup grilled/baked asparagus and small salad with Salad Spritzer.   Mid-Day snack: 200 calorie protein drink   Dinner: 4oz Grilled fish, ½ cup white beans, ½ cup cooked spinach   Evening Snack: fudgsicle no-sugar-added   Menu # 4   Breakfast: 1 scoop vanilla protein powder + 4oz skim milk + 4oz coffee   Snack: Pure protein bar   Lunch: 2 Lettuce tacos: 3oz seasoned ground turkey wrapped in a Osiel lettuce leaves with 1 Tbsp shredded cheese and dollop low-fat sour cream   Snack: ½ cup cottage cheese, ½ cup pineapple chunks   Dinner: Shrimp omelet: 2 eggs, ½ cup shrimp, green onions, and shredded cheese   ______________________________________________________   Menu #5   Breakfast: 1 cup low-fat cottage cheese, ½ cup peaches (no sugar added)   Snack: 1 apple, 4 cubes cheese   Lunch: 3oz baked pork chop, 1 cup okra and tomato stew   Snack: 1/2 cup black beans + salsa + dollop light sour cream   Dinner: Caprese chicken salad: 3 oz chicken breast, 1oz fresh mozzarella, sliced tomato, 1 Tbsp olive oil, basil   Snack: sugar-free popsicle   _______________________________________________________  Menu #6   Breakfast: ½ cup part-skim ricotta cheese, 2 Tbsp sugar-free strawberry preserves, sprinkle of slivered almonds   Snack: 1 orange + string cheese  Lunch: 3 oz canned chicken, 1oz shredded cheddar cheese, ½ cup black beans, 2 Tbsp salsa   Snack: 200 calorie Protein drink   Dinner: 4 oz grilled salmon steak, over mixed green salad with 2  tbsp light dressing   _______________________________________________________  Menu #7  Breakfast: crust-less quiche (bake 1 egg mixed w/ low fat cheese, broccoli and low sodium ham in a muffin cup until cooked inside)  Snack: 1 light yogurt  Lunch: protein shake (1 scoop powder + 8 oz skim milk, blended w/ ice)  Snack: small apple w/ 2 tbsp PB2 (powdered peanut butter)  Dinner: 2 Turkey meatballs w/ low sugar marinara sauce, 1/2 cup spiralized zucchini (sauteed on stove top w/ nonstick cookspray)

## 2019-11-19 NOTE — LETTER
[facility]  [address]  [place] [address]  Phone: [phone]  Fax: [phone] December 2, 2019      Myrna John, [facility]  [address]  [place] [address]     Patient: Marlyn Kim   MR Number: [id number]   YOB: 1985   Date of Visit: 11/19/2019     Dear Dr. John:    Thank you for referring Marlyn Kim to me for evaluation. Attached you will find relevant portions of my assessment and plan of care.    DIAGNOSIS:  1. Morbid Obesity with Body mass index is 48.1 kg/m². and difficulty with weight loss.  2. Co-morbidities: HTN     PLAN:  The patient is a potential candidate for Bariatric Surgery. She is interested in sleeve gastrectomy with Dr. Bonilla. The surgery and post-op care were discussed in detail with the patient. All questions were answered.     She understands that bariatric surgery is a tool to aid in weight loss and that she needs to be committed to the diet and exercise post-operatively for successful weight loss.  We discussed expected weight loss outcomes after surgery which is 50% of the excess weight on her frame. It was discussed with the patient that bariatric surgery is not the easy way out and that it will take plenty of dedication on the patient's part to be successful. We also discussed the possibility of weight regain if the patient strays from the diet guidelines or exercise requirements. The patient verbalized understanding and wishes to proceed with the work-up.     ORDERS:  1. Chest X-Ray and EKG  2. Psychological Consult and Bariatric Dietician Consult  3. Bariatric Labs: BMP, CBC, Folate Serum, H. pylori, HgA1C, Hepatic Panel/LFT, Iron & TIBC, Lipid Profile, Magnesium, Phosphate, T3, T4, TSH, Free T4, Vitamin B12, and Vitamin B1.  4. Quit smoking.  Please call the office when you have smoked your last cigarette and you will be scheduled 1 month later to check your nicotine levels.    5. A1c < 8.5% for  surgery  6. Short MMPI.  7. Smoking contract     60 minute visit, over 50% of time spent counseling patient face to face on surgical options, risks, benefits, expected diet, recommended exercise regimen, and expected weight loss.    If you have questions, please do not hesitate to call me. I look forward to following Marlyn Kim along with you.    Sincerely,    Ivania Moise, ZAHRA  Section of Bariatric Surgery  Department of Surgery  [facility]    NF/afw    CC  DARRYL RAI STAFF

## 2019-11-19 NOTE — PROGRESS NOTES
"NUTRITIONAL CONSULT    Referring Physician: Raciel Bonilla M.D.  Reason for MNT Referral: Initial assessment for sleeve gastrectomy work-up    PAST MEDICAL HISTORY:   34 y.o. female  There is no height or weight on file to calculate BMI..  Weight history includes lowest wt as adult was 200 lbs 23 years old.  Wt difficulty started in childhood, worsened with childbearing. Her highest has been 320 pounds 2.5 years ago.   Dieting attempts   Portion control eat more baked foods over fried foodsThe patient has tried diet and exercise; denies popular diets. She reports she has not been successful with 6-7 months of dieting; she has not been able to lose more than 10 pounds.     Past Medical History:   Diagnosis Date    BMI 45.0-49.9, adult     Diabetes mellitus     Hypertension     Obesity        CLINICAL DATA:  34 y.o.-year-old Black or  female.  Height: 5'5"  Weight: 289 lbs  IBW: 141 lbs  BMI: 48.1  The patient's goal weight (50% EBW):215 lbs  Personal goal weight: 180 lbs when pt was 19 years old    Goal for Bariatric Surgery: to improve health and to improve quality of life    DAILY NUTRITIONAL NEEDS:   1429 Calories (using [address] Jeor Equation  BMR=  x AF 1.2= 2429 -1000 calories for wt loss)   76-96 Grams Protein (1.2-1.5 gm/kg IBW/d)    NUTRITION & HEALTH HISTORY:  Greatest challenge: irregular meal patterns      Current diet recall:   Breakfast: half a bagel and just crack an egg coffee with sugar 2 tbsp and 2 creamer caramel macchino- 32 ounces  Lunch: Leftover ie baked pork chop supriya greens and yellow rice  Dinner: protein and vegetables and starchy carbs    Current Diet:  Meal pattern:   Protein supplements: Whey protein powder mix water- does not like milk  Snackin / day  Vegetables: Likes a variety. Eats daily.  Fruits: Likes a variety. Eats once per week.  Beverages: water, soda, coffee with sugar and Snapple  Dining out: Weekly. once a weekMostly take-out.  Cooking at " home: Daily. Mostly baked, grilled and fried meat, fish, starchy CHO and vegetables.  Frying once a week or every 2 weeks    Exercise:  Past exercise: Adequate    Current exercise: Adequate 10,000 steps per day  Restrictions to exercise:     Vitamins / Minerals / Herbs:   none      Labs:   none available at this time    Food Allergies:   None reported    Social:  Works regular daytime shifts.  Just started working days for past 2 months  Lives with  and 5 children  Grocery shopping and food prep  does grocery shopping both pt and  split food prep  Patient believes the household will be supportive after surgery.  Alcohol: Twice a week.  Smoking: 3 cigarettes  per week    ASSESSMENT:  · Patient reports attempts at weight loss, only to regain lost weight.  · Patient demonstrated knowledge of healthy eating behaviors and exercise patterns; admits to not eating healthy and not exercising at this point.  · Patient states willingness to change lifestyle and make behavior modifications as evidenced by good exercise.    Insurance requires medically supervised diet prior to consideration for bariatric surgery.    Barriers to Education: none    Stage of change: determination    NUTRITION DIAGNOSIS:    Obesity related to Excessive carbohydrate intake and Inadequate protein intake as evidence by BMI.    BARIATRIC DIET DISCUSSION/PLAN:  Discussed diet after surgery and related to patient's food record.  Reviewed nutrition guidelines for before and after surgery.  Answered all questions.  Work on Bariatric Nutrition Checklist.  Work on gradually cutting back on starchy CHO in the diet.  1200-calorie diet.  5-6 meals per day.  Start including protein supplements in the diet plan daily.  Reduce frequency of dining out.  More grocery shopping and meal preparation at home.  Return to clinic.    RECOMMENDATIONS:  Patient is a potential candidate for bariatric surgery.    Needs 5 additional visit(s) with  RD.    Patient and Spouse verbalized understanding.    Expect good  compliance after surgery at this time.    Communicated nutrition plan with bariatric team.    SESSION TIME:  60 minutes

## 2019-11-19 NOTE — PROGRESS NOTES
BARIATRIC NEW PATIENT EVALUATION    CHIEF COMPLAINT:   Morbid Obesity Body mass index is 48.1 kg/m². and difficulty with weight loss.     HISTORY OF PRESENT ILLNESS:  Marlyn Kim  is a 34 y.o. female presenting for morbid obesity Body mass index is 48.1 kg/m². and difficulty with weight loss. The patient has tried diet and exercise; denies popular diets. She reports she has not been successful with 6-7 months of dieting; she has not been able to lose more than 10 pounds. Wt difficulty started in childhood, worsened with childbearing. Her highest has been 320 pounds 2.5 years ago.     Psych history- denies.    PAST MEDICAL HISTORY:  Past Medical History:   Diagnosis Date    BMI 45.0-49.9, adult     Diabetes mellitus     Hypertension     Obesity      PAST SURGICAL HISTORY:  Past Surgical History:   Procedure Laterality Date     SECTION      CHOLECYSTECTOMY      TUBAL LIGATION Bilateral 2015       FAMILY HISTORY:  Family History   Problem Relation Age of Onset    Diabetes Mother     Heart disease Mother     Hypertension Mother     Multiple sclerosis Mother     Diabetes Sister     Hypertension Brother     Cancer Neg Hx     Stroke Neg Hx        SOCIAL HISTORY:  Social History     Socioeconomic History    Marital status:      Spouse name: Not on file    Number of children: Not on file    Years of education: Not on file    Highest education level: Not on file   Occupational History    Not on file   Social Needs    Financial resource strain: Not on file    Food insecurity:     Worry: Not on file     Inability: Not on file    Transportation needs:     Medical: Not on file     Non-medical: Not on file   Tobacco Use    Smoking status: Current Some Day Smoker     Packs/day: 0.25     Types: Cigarettes     Start date: 2019    Smokeless tobacco: Never Used    Tobacco comment: Every other day smokes about two cigs   Substance and Sexual Activity    Alcohol use: Yes      Comment: Socially    Drug use: Never    Sexual activity: Yes     Partners: Male     Birth control/protection: Surgical   Lifestyle    Physical activity:     Days per week: Not on file     Minutes per session: Not on file    Stress: Not on file   Relationships    Social connections:     Talks on phone: Not on file     Gets together: Not on file     Attends Gnosticism service: Not on file     Active member of club or organization: Not on file     Attends meetings of clubs or organizations: Not on file     Relationship status: Not on file   Other Topics Concern    Not on file   Social History Narrative    Not on file       MEDICATIONS:  Current Outpatient Medications   Medication Sig Dispense Refill    amLODIPine (NORVASC) 10 MG tablet TK ONE T PO QD. 30 tablet 11    blood sugar diagnostic Strp USE TO TEST BLOOD SUGAR TWICE DAILY 100 strip 6    blood-glucose meter kit USE TO TEST BLOOD SUGAR TWICE DAILY 1 each 0    lancets 28 gauge Misc USE TO TEST BLOOD SUGAR TWICE DAILY 100 each 6    losartan (COZAAR) 50 MG tablet Take 1 tablet (50 mg total) by mouth once daily. 90 tablet 3    metFORMIN (GLUCOPHAGE-XR) 500 MG 24 hr tablet Take 2 tablets (1,000 mg total) by mouth Daily. 180 tablet 11     No current facility-administered medications for this visit.        ALLERGIES:  Review of patient's allergies indicates:   Allergen Reactions    Ciprofloxacin        ROS:  Review of Systems   Constitutional: Negative for chills, fever and malaise/fatigue.   Eyes: Negative for blurred vision and double vision.   Respiratory: Negative for cough, shortness of breath and wheezing.    Cardiovascular: Negative for chest pain, palpitations and leg swelling.   Gastrointestinal: Negative for abdominal pain, blood in stool, constipation, diarrhea, heartburn, nausea and vomiting.   Genitourinary: Negative for dysuria, frequency and urgency.   Musculoskeletal: Negative for back pain, joint pain, myalgias and neck pain.  "  Neurological: Negative for dizziness, tingling and headaches.   Psychiatric/Behavioral: Negative for depression, substance abuse and suicidal ideas. The patient is not nervous/anxious.        PE:  Vitals:    11/19/19 1355   BP: 116/78   Pulse: 96   Weight: 131.1 kg (289 lb 0.4 oz)   Height: 5' 5" (1.651 m)       Physical Exam   Constitutional: She is oriented to person, place, and time. She appears well-developed and well-nourished.   HENT:   Head: Normocephalic and atraumatic.   Eyes: Conjunctivae and EOM are normal.   Neck: Neck supple.   Cardiovascular: Normal rate, regular rhythm, normal heart sounds and intact distal pulses. Exam reveals no gallop and no friction rub.   No murmur heard.  Pulmonary/Chest: Effort normal and breath sounds normal. No respiratory distress. She has no wheezes. She has no rales.   Abdominal: Soft. Bowel sounds are normal. She exhibits no distension and no mass. There is no tenderness. There is no rebound and no guarding. No hernia.   Musculoskeletal: Normal range of motion. She exhibits no edema.   Neurological: She is alert and oriented to person, place, and time.   Skin: Skin is warm and dry. Capillary refill takes less than 2 seconds.   Psychiatric: She has a normal mood and affect. Her behavior is normal.   Vitals reviewed.       DIAGNOSIS:  1. Morbid Obesity with Body mass index is 48.1 kg/m². and difficulty with weight loss.  2. Co-morbidities: HTN    PLAN:  The patient is a potential candidate for Bariatric Surgery. she is interested in sleeve gastrectomy with Dr. Bonilla. The surgery and post-op care were discussed in detail with the patient. All questions were answered.    she understands that bariatric surgery is a tool to aid in weight loss and that she needs to be committed to the diet and exercise post-operatively for successful weight loss.  Discussed expected weight loss outcomes after surgery which is 50% of the excess weight on her frame. Discussed with patient " that bariatric surgery is not the easy way out and that it will take plenty of dedication on the patient's part to be successful. Also discussed the possibility of weight regain if the patient strays from the diet guidelines or exercise requirements. Patient verbalized understanding and wishes to proceed with the work-up.    ORDERS:  1. Chest X-Ray and EKG  2. Psychological Consult and Bariatric Dietician Consult  3. Bariatric Labs: BMP, CBC, Folate Serum, H. pylori, HgA1C, Hepatic Panel/LFT, Iron & TIBC, Lipid Profile, Magnesium, Phosphate, T3, T4, TSH, Free T4, Vitamin B12, and Vitamin B1.  4. Quit smoking.  Please call the office when you have smoked your last cigarette and you will be scheduled 1 month later to check your nicotine levels.    5. A1c < 8.5% for surgery  6. Short MMPI.  7. Smoking contract      Primary Physician: Primary Doctor No -- letter routed to SALOMON SWEENEY and Dr. France, last seen in .  RTC: As scheduled.    60 minute visit, over 50% of time spent counseling patient face to face on surgical options, risks, benefits, expected diet, recommended exercise regimen, and expected weight loss.

## 2019-11-20 ENCOUNTER — LAB VISIT (OUTPATIENT)
Dept: LAB | Facility: HOSPITAL | Age: 34
End: 2019-11-20
Payer: COMMERCIAL

## 2019-11-20 DIAGNOSIS — E11.65 UNCONTROLLED TYPE 2 DIABETES MELLITUS WITH HYPERGLYCEMIA: ICD-10-CM

## 2019-11-20 DIAGNOSIS — E66.01 MORBID OBESITY WITH BMI OF 45.0-49.9, ADULT: ICD-10-CM

## 2019-11-20 DIAGNOSIS — I15.2 HYPERTENSION DUE TO ENDOCRINE DISORDER: ICD-10-CM

## 2019-11-20 LAB
25(OH)D3+25(OH)D2 SERPL-MCNC: 4 NG/ML (ref 30–96)
ALBUMIN SERPL BCP-MCNC: 3 G/DL (ref 3.5–5.2)
ALP SERPL-CCNC: 120 U/L (ref 55–135)
ALT SERPL W/O P-5'-P-CCNC: 11 U/L (ref 10–44)
ANION GAP SERPL CALC-SCNC: 10 MMOL/L (ref 8–16)
AST SERPL-CCNC: 13 U/L (ref 10–40)
BASOPHILS # BLD AUTO: 0.04 K/UL (ref 0–0.2)
BASOPHILS NFR BLD: 0.5 % (ref 0–1.9)
BILIRUB DIRECT SERPL-MCNC: 0.2 MG/DL (ref 0.1–0.3)
BILIRUB SERPL-MCNC: 0.4 MG/DL (ref 0.1–1)
BUN SERPL-MCNC: 9 MG/DL (ref 6–20)
CALCIUM SERPL-MCNC: 8.9 MG/DL (ref 8.7–10.5)
CHLORIDE SERPL-SCNC: 104 MMOL/L (ref 95–110)
CHOLEST SERPL-MCNC: 179 MG/DL (ref 120–199)
CHOLEST/HDLC SERPL: 4.5 {RATIO} (ref 2–5)
CO2 SERPL-SCNC: 25 MMOL/L (ref 23–29)
CREAT SERPL-MCNC: 0.8 MG/DL (ref 0.5–1.4)
DIFFERENTIAL METHOD: ABNORMAL
EOSINOPHIL # BLD AUTO: 0.1 K/UL (ref 0–0.5)
EOSINOPHIL NFR BLD: 1.1 % (ref 0–8)
ERYTHROCYTE [DISTWIDTH] IN BLOOD BY AUTOMATED COUNT: 17.5 % (ref 11.5–14.5)
EST. GFR  (AFRICAN AMERICAN): >60 ML/MIN/1.73 M^2
EST. GFR  (NON AFRICAN AMERICAN): >60 ML/MIN/1.73 M^2
ESTIMATED AVG GLUCOSE: 203 MG/DL (ref 68–131)
FOLATE SERPL-MCNC: 5.7 NG/ML (ref 4–24)
GLUCOSE SERPL-MCNC: 211 MG/DL (ref 70–110)
H PYLORI IGG SERPL QL IA: POSITIVE
HBA1C MFR BLD HPLC: 8.7 % (ref 4–5.6)
HCT VFR BLD AUTO: 32.5 % (ref 37–48.5)
HDLC SERPL-MCNC: 40 MG/DL (ref 40–75)
HDLC SERPL: 22.3 % (ref 20–50)
HGB BLD-MCNC: 10.2 G/DL (ref 12–16)
IMM GRANULOCYTES # BLD AUTO: 0.03 K/UL (ref 0–0.04)
IMM GRANULOCYTES NFR BLD AUTO: 0.4 % (ref 0–0.5)
IRON SERPL-MCNC: 21 UG/DL (ref 30–160)
LDLC SERPL CALC-MCNC: 113.8 MG/DL (ref 63–159)
LYMPHOCYTES # BLD AUTO: 1.9 K/UL (ref 1–4.8)
LYMPHOCYTES NFR BLD: 22.6 % (ref 18–48)
MAGNESIUM SERPL-MCNC: 1.6 MG/DL (ref 1.6–2.6)
MCH RBC QN AUTO: 21.9 PG (ref 27–31)
MCHC RBC AUTO-ENTMCNC: 31.4 G/DL (ref 32–36)
MCV RBC AUTO: 70 FL (ref 82–98)
MONOCYTES # BLD AUTO: 0.5 K/UL (ref 0.3–1)
MONOCYTES NFR BLD: 5.6 % (ref 4–15)
NEUTROPHILS # BLD AUTO: 5.7 K/UL (ref 1.8–7.7)
NEUTROPHILS NFR BLD: 69.8 % (ref 38–73)
NONHDLC SERPL-MCNC: 139 MG/DL
NRBC BLD-RTO: 0 /100 WBC
PHOSPHATE SERPL-MCNC: 2.5 MG/DL (ref 2.7–4.5)
PLATELET # BLD AUTO: 331 K/UL (ref 150–350)
PMV BLD AUTO: 10.7 FL (ref 9.2–12.9)
POTASSIUM SERPL-SCNC: 4 MMOL/L (ref 3.5–5.1)
PROT SERPL-MCNC: 7.4 G/DL (ref 6–8.4)
RBC # BLD AUTO: 4.65 M/UL (ref 4–5.4)
SATURATED IRON: 5 % (ref 20–50)
SODIUM SERPL-SCNC: 139 MMOL/L (ref 136–145)
T3 SERPL-MCNC: 84 NG/DL (ref 60–180)
T4 FREE SERPL-MCNC: 0.92 NG/DL (ref 0.71–1.51)
T4 SERPL-MCNC: 6.3 UG/DL (ref 4.5–11.5)
TOTAL IRON BINDING CAPACITY: 450 UG/DL (ref 250–450)
TRANSFERRIN SERPL-MCNC: 304 MG/DL (ref 200–375)
TRIGL SERPL-MCNC: 126 MG/DL (ref 30–150)
TSH SERPL DL<=0.005 MIU/L-ACNC: 2.56 UIU/ML (ref 0.4–4)
VIT B12 SERPL-MCNC: 279 PG/ML (ref 210–950)
WBC # BLD AUTO: 8.19 K/UL (ref 3.9–12.7)

## 2019-11-20 PROCEDURE — 83735 ASSAY OF MAGNESIUM: CPT

## 2019-11-20 PROCEDURE — 84100 ASSAY OF PHOSPHORUS: CPT

## 2019-11-20 PROCEDURE — 82607 VITAMIN B-12: CPT

## 2019-11-20 PROCEDURE — 84436 ASSAY OF TOTAL THYROXINE: CPT

## 2019-11-20 PROCEDURE — 84425 ASSAY OF VITAMIN B-1: CPT

## 2019-11-20 PROCEDURE — 82306 VITAMIN D 25 HYDROXY: CPT

## 2019-11-20 PROCEDURE — 84439 ASSAY OF FREE THYROXINE: CPT

## 2019-11-20 PROCEDURE — 85025 COMPLETE CBC W/AUTO DIFF WBC: CPT

## 2019-11-20 PROCEDURE — 80048 BASIC METABOLIC PNL TOTAL CA: CPT

## 2019-11-20 PROCEDURE — 86677 HELICOBACTER PYLORI ANTIBODY: CPT

## 2019-11-20 PROCEDURE — 84480 ASSAY TRIIODOTHYRONINE (T3): CPT

## 2019-11-20 PROCEDURE — 80076 HEPATIC FUNCTION PANEL: CPT

## 2019-11-20 PROCEDURE — 83036 HEMOGLOBIN GLYCOSYLATED A1C: CPT

## 2019-11-20 PROCEDURE — 82746 ASSAY OF FOLIC ACID SERUM: CPT

## 2019-11-20 PROCEDURE — 36415 COLL VENOUS BLD VENIPUNCTURE: CPT

## 2019-11-20 PROCEDURE — 80061 LIPID PANEL: CPT

## 2019-11-20 PROCEDURE — 83540 ASSAY OF IRON: CPT

## 2019-11-20 PROCEDURE — 84443 ASSAY THYROID STIM HORMONE: CPT

## 2019-11-21 LAB — VIT B1 BLD-MCNC: 27 UG/L (ref 38–122)

## 2019-11-22 ENCOUNTER — TELEPHONE (OUTPATIENT)
Dept: BARIATRICS | Facility: CLINIC | Age: 34
End: 2019-11-22

## 2019-11-22 DIAGNOSIS — E51.9 THIAMIN DEFICIENCY: ICD-10-CM

## 2019-11-22 DIAGNOSIS — E55.9 VITAMIN D INSUFFICIENCY: Primary | ICD-10-CM

## 2019-11-22 RX ORDER — THIAMINE HYDROCHLORIDE 100 MG/ML
100 INJECTION, SOLUTION INTRAMUSCULAR; INTRAVENOUS WEEKLY
Status: COMPLETED | OUTPATIENT
Start: 2019-11-22 | End: 2019-11-27

## 2019-11-22 RX ORDER — ERGOCALCIFEROL 1.25 MG/1
50000 CAPSULE ORAL
Qty: 24 CAPSULE | Refills: 0 | Status: SHIPPED | OUTPATIENT
Start: 2019-11-25 | End: 2020-02-20

## 2019-11-22 RX ORDER — AMOXICILLIN 500 MG/1
1000 TABLET, FILM COATED ORAL EVERY 12 HOURS
Qty: 56 TABLET | Refills: 0 | Status: SHIPPED | OUTPATIENT
Start: 2019-11-22 | End: 2019-12-06

## 2019-11-22 RX ORDER — CLARITHROMYCIN 500 MG/1
500 TABLET, FILM COATED ORAL EVERY 12 HOURS
Qty: 28 TABLET | Refills: 0 | Status: SHIPPED | OUTPATIENT
Start: 2019-11-22 | End: 2019-12-06

## 2019-11-22 RX ORDER — OMEPRAZOLE 20 MG/1
20 CAPSULE, DELAYED RELEASE ORAL 2 TIMES DAILY
Qty: 28 CAPSULE | Refills: 0 | Status: SHIPPED | OUTPATIENT
Start: 2019-11-22 | End: 2020-02-20

## 2019-11-22 NOTE — TELEPHONE ENCOUNTER
LVM instructing patient to call us at [phone] as soon as possible regarding recent lab work for questions and further instruction.

## 2019-11-22 NOTE — PROGRESS NOTES
+ H pylori, pt notified via [facility] and Rx sent to pharmacy. Please make sure that pt picks up the medication and takes. Thanks.

## 2019-11-22 NOTE — TELEPHONE ENCOUNTER
B1 is low.   Nursing, please place on thiamine pathway. Please f/u with me for orders.    Vit D is low.  Please advise of treatment. Order attached.    Anemia. She may start MVI with iron and she needs to see her to see her PCP for evaluation and treatment of long standing anemia.

## 2019-11-25 ENCOUNTER — CLINICAL SUPPORT (OUTPATIENT)
Dept: INFECTIOUS DISEASES | Facility: CLINIC | Age: 34
End: 2019-11-25
Payer: COMMERCIAL

## 2019-11-25 PROCEDURE — 96372 PR INJECTION,THERAP/PROPH/DIAG2ST, IM OR SUBCUT: ICD-10-PCS | Mod: S$GLB,,, | Performed by: INTERNAL MEDICINE

## 2019-11-25 PROCEDURE — 96372 THER/PROPH/DIAG INJ SC/IM: CPT | Mod: S$GLB,,, | Performed by: INTERNAL MEDICINE

## 2019-11-25 RX ADMIN — THIAMINE HYDROCHLORIDE 100 MG: 100 INJECTION, SOLUTION INTRAMUSCULAR; INTRAVENOUS at 10:11

## 2019-11-26 ENCOUNTER — CLINICAL SUPPORT (OUTPATIENT)
Dept: INFECTIOUS DISEASES | Facility: CLINIC | Age: 34
End: 2019-11-26
Payer: COMMERCIAL

## 2019-11-26 PROCEDURE — 96372 THER/PROPH/DIAG INJ SC/IM: CPT | Mod: S$GLB,,, | Performed by: INTERNAL MEDICINE

## 2019-11-26 PROCEDURE — 96372 PR INJECTION,THERAP/PROPH/DIAG2ST, IM OR SUBCUT: ICD-10-PCS | Mod: S$GLB,,, | Performed by: INTERNAL MEDICINE

## 2019-11-26 RX ADMIN — THIAMINE HYDROCHLORIDE 100 MG: 100 INJECTION, SOLUTION INTRAMUSCULAR; INTRAVENOUS at 02:11

## 2019-11-27 ENCOUNTER — CLINICAL SUPPORT (OUTPATIENT)
Dept: INFECTIOUS DISEASES | Facility: CLINIC | Age: 34
End: 2019-11-27
Payer: COMMERCIAL

## 2019-11-27 PROCEDURE — 96372 THER/PROPH/DIAG INJ SC/IM: CPT | Mod: S$GLB,,, | Performed by: INTERNAL MEDICINE

## 2019-11-27 PROCEDURE — 96372 PR INJECTION,THERAP/PROPH/DIAG2ST, IM OR SUBCUT: ICD-10-PCS | Mod: S$GLB,,, | Performed by: INTERNAL MEDICINE

## 2019-11-27 RX ADMIN — THIAMINE HYDROCHLORIDE 100 MG: 100 INJECTION, SOLUTION INTRAMUSCULAR; INTRAVENOUS at 02:11

## 2019-11-29 ENCOUNTER — LAB VISIT (OUTPATIENT)
Dept: LAB | Facility: HOSPITAL | Age: 34
End: 2019-11-29
Payer: COMMERCIAL

## 2019-11-29 DIAGNOSIS — E51.9 THIAMIN DEFICIENCY: ICD-10-CM

## 2019-11-29 PROCEDURE — 36415 COLL VENOUS BLD VENIPUNCTURE: CPT

## 2019-11-29 PROCEDURE — 84425 ASSAY OF VITAMIN B-1: CPT

## 2019-12-03 LAB — VIT B1 BLD-MCNC: 68 UG/L (ref 38–122)

## 2019-12-16 ENCOUNTER — OFFICE VISIT (OUTPATIENT)
Dept: PSYCHIATRY | Facility: CLINIC | Age: 34
End: 2019-12-16
Payer: COMMERCIAL

## 2019-12-16 ENCOUNTER — TELEPHONE (OUTPATIENT)
Dept: BARIATRICS | Facility: CLINIC | Age: 34
End: 2019-12-16

## 2019-12-16 DIAGNOSIS — E66.01 MORBID OBESITY DUE TO EXCESS CALORIES: ICD-10-CM

## 2019-12-16 DIAGNOSIS — E11.65 UNCONTROLLED TYPE 2 DIABETES MELLITUS WITH HYPERGLYCEMIA: ICD-10-CM

## 2019-12-16 DIAGNOSIS — I15.2 HYPERTENSION DUE TO ENDOCRINE DISORDER: ICD-10-CM

## 2019-12-16 DIAGNOSIS — Z01.818 PREOP EXAMINATION: Primary | ICD-10-CM

## 2019-12-16 PROCEDURE — 90791 PR PSYCHIATRIC DIAGNOSTIC EVALUATION: ICD-10-PCS | Mod: S$GLB,,, | Performed by: PSYCHOLOGIST

## 2019-12-16 PROCEDURE — 90791 PSYCH DIAGNOSTIC EVALUATION: CPT | Mod: S$GLB,,, | Performed by: PSYCHOLOGIST

## 2019-12-16 NOTE — TELEPHONE ENCOUNTER
Called and spoke with pt.  Pt cannot come in this week due to work and unsure when she can come back.  Pt to call back to reschedule appt.

## 2019-12-16 NOTE — PROGRESS NOTES
"Psychiatry Initial Visit (PhD/LCSW)   Diagnostic Interview - CPT 47149     Date: 12/16/2019    Site: [facility]     Referral source: Raciel Bonilla Jr., M.D.     Clinical status of patient: Outpatient     Ms. Kim, a 34 y.o.  female, presented for initial evaluation visit. Before this evaluation was initiated, the purposes and process of the assessment and the limits of confidentiality were discussed with the patient who expressed understanding of these issues and orally consented to proceed with the evaluation.     Chief complaint/reason for encounter: Routine psychological evaluation prior to bariatric surgery.     Type of surgery sought:  sleeve gastrectomy     History of present illness:  Ms. Kim is a 34 y.o.  female who is pursuing bariatric surgery to improve her health and quality of life.  She denied a history of mental health symptoms and treatment.  She denied any history of suicidality or non-suicidal self-injury.  She presents as psychologically stable and has a healthy support system.  She has attempted making positive lifestyle changes in anticipation for surgery, with reported benefit.  The patient has a Body Mass Index of 48.1 kg/m² as documented by the referring provider.    Ms. Kim has struggled with weight since "my whole life, but after my last two kids was no longer happy with myself and struggling to lose the weight."  Factors that have contributed to her weight gain over the years include:  not exercising, poor diet.  She also reported for most of her career she has worked night shifts and only recently switched to working during the day.  Her previous work schedule likely also contributed to her poor eating habits.  She has tried some weight loss methods on her own (i.e., cutting back portions, exercise) with minimal success. She stated she would become frustrated when she is making effort to lose weight and the progress was very slow.  She " "believes that her biggest weight loss challenge is exercising.  Her motivation for seeking surgery now is "my overall health."   Her mother has a history of heart problems, and she is worried that will happen to her.  Her postsurgical goals include "to be at a comfortable weight and be healthy, come off the blood pressure medication, not be a diabetic anymore."    Ms. Kim has met with bariatric nutritionist Irma Jackson RD, and reports that she has made the following lifestyle changes since beginning the bariatric program: cutting back on sugar intake, eat better, no greasy food.  She must continue meeting with Ms. Jackson to demonstrate the implementation of lifestyle changes prior to clearance for bariatric surgery.  She chose the gastric sleeve because "I think it fits me better and know some people who had good results with the sleeve."  She understood that she needs to focus on protein intake after surgery, which includes protein supplements.  She noted that she will need to stay away from greasy food, big portions, soda, caffeine, bread, and pasta after surgery.  She hopes to lose 100 pounds and expects it will take one year.  She plans to take two weeks to recover after surgery.  Her mother and  will be available to help her during recovery.    Medical History:  Hypertension, diabetes     Current medications and drug reactions:  see medication list.    Pain:  Denied.    Psychiatric Symptoms:  Depression:  Denied current or recent depression.  She reported feeling sad after giving birth during adolescence to her two children due to the relationships with the fathers not working out and being a single mother.  However, she denied a lifetime of episodes of depressed mood or depression-related anhedonia, lack of motivation, lethargy, difficulty concentrating, feelings of worthlessness or guilt, hopelessness, appetite changes, or psychomotor changes.  Based on her reports, her symptoms do not meet full " "criteria for Major Depressive Disorder or any other depressive disorder.  She obtained a score of 4 on the Huynh Depression Inventory 2, indicating minimal depression.  Judy/Hypomania:  Denied.  She denied periods of elevated mood or abnormally increased energy or goal-directed activity.  Anxiety:  Denied.  She denied experiencing excessive, exaggerated anxiety that was unmanageable.  Based on her reports, her symptoms do not meet full criteria for Generalized Anxiety Disorder or other anxiety disorders. She obtained a score of 0 on the Huynh Anxiety Inventory, indicating no symptoms of anxiety.  Thoughts:  Denied delusions, hallucinations.  Suicidal thoughts/behaviors:  Denied.  Self-injury:  Denied.  Sleep: She experienced some difficulty sleeping when transition from working nights to days, but this worked out and she is sleeping well now.  Cognitive functioning:  Denied problems.    Current psychosocial stressors:  "The bills, the kids stress me out, all normal."  Report of coping skills:  Talking to her .  Support system:   and mother  Strengths and liabilities:  Strength: Patient accepts guidance/feedback, Strength: Patient is motivated for change., Strength: Patient has positive support network., Strength: Patient has reasonable judgment., Strength: Patient is stable.    Current and past substance use:  Alcohol: Occasional social drinking; denied history of abuse or dependency.   Drugs: Denied current use; denied history of abuse or dependency.  Tobacco: She stopped smoking cigarettes about two weeks ago. She stated prior she typically only smoked a cigarette a few times per week.  Caffeine: She drinks a cup of coffee per day.    Current Psychiatric Treatment:  Medications:  Denied.  Psychotherapy:  Denied.    Psychiatric History:  Previous diagnosis:  Denied.  Previous hospitalizations:  Denied.  History of outpatient treatment:  Denied.  Previous suicide attempt:  Denied.  Family history of " "psychiatric illness:  None reported.    Trauma history: Denied any traumatic events meeting criterion A for PTSD.  She lost her grandmother at age 17 and lost her uncle and father a month apart a few years ago.     History of eating disorders:  History of bulimia:  She denied recurrent episodes of binge eating and inappropriate compensatory behaviors.   History of binge-eating episodes:  She denied eating excessive amounts of food within a discrete time period with a lack of control during eating.  She denied eating more rapidly than normal, eating until uncomfortably full, eating large amounts of food when not physically hungry, eating alone due to embarrassment, or negative emotions (i.e., disgusted, guilty, depressed) afterwards.    Social history (marriage, employment, etc.):  Ms. Kim was born and raised in [place], [place] by her biological mother along with her brother and sister.  She described her childhood as "good."  She denied childhood trauma, abuse, and neglect.  She enjoyed school, but dropped out in the 11th grade.  She left school because she had two babies, first one at age 16.  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  She is not on disability and finances are fine.  She has been  to her  for nine years and stated their relationship is "good."  She has five children (ages 17, 14, 14 stepson, 10, and 8).  She currently lives with her  and children.  She attends a [employer] and considers her [employer] an important part of her life.  She spends much of her time outside of work with her  and children and occasionally talks to some friends.  She enjoys making chocolate covered strawberries, birthday pins, and reading.    Legal history: Denied.    Mental Status Exam:   General appearance:  appears stated age, neatly dressed, well groomed  Speech:  normal rate and tone  Level of cooperation:  cooperative  Thought " processes:  logical, goal-directed  Mood:  euthymic  Thought content:  no illusions, no visual hallucinations, no auditory hallucinations, no delusions, no active or passive homicidal thoughts, no active or passive suicidal ideation, no obsessions, no compulsions, no violence  Affect:  appropriate  Orientation:  oriented to person, place, and date  Memory:  Recent memory:  1 of 3 objects after brief delay.    Remote memory - intact  Attention span and concentration:  spelled HOUSE forward and backwards  Fund of general knowledge: 3 of 3 recent presidents  Abstract reasoning:    Similarities: concrete   Proverbs: concrete.  Judgment and insight: fair  Language:  intact    Diagnostic Impression:        ICD-10-CM ICD-9-CM         1. Preop examination Z01.818 V72.84   2. Morbid obesity due to excess calories E66.01 278.01   3. Hypertension due to endocrine disorder I15.2 405.99 ...   4. Uncontrolled type 2 diabetes mellitus with hyperglycemia E11.65 250.02   5. BMI 45.0-49.9, adult Z68.42 V85.42        Summary/Conclusion:   There are no overt psychological contraindications for proceeding with bariatric surgery.  The patient has no significant psychiatric history, and reports no current psychiatric problems or major adjustment issues.  The patient has reasonable expectations for surgery, good social support, and has already begun making appropriate lifestyle changes in anticipation for surgery. The patient has verbalized appropriate awareness and commitment to the necessary behavioral changes associated with bariatric surgery and appears willing to comply with long-term lifestyle changes. There are no recommendations for psychological treatment at this time. The patient is aware of resources available should her needs change in the future.    Recommendations:    Clear   This patient is psychologically cleared to proceed with bariatric surgery.         Length of Session:  30 minutes

## 2019-12-17 ENCOUNTER — PATIENT MESSAGE (OUTPATIENT)
Dept: BARIATRICS | Facility: CLINIC | Age: 34
End: 2019-12-17

## 2019-12-23 ENCOUNTER — CLINICAL SUPPORT (OUTPATIENT)
Dept: BARIATRICS | Facility: CLINIC | Age: 34
End: 2019-12-23
Payer: COMMERCIAL

## 2019-12-23 VITALS — WEIGHT: 285.5 LBS | BODY MASS INDEX: 47.51 KG/M2

## 2019-12-23 DIAGNOSIS — E66.01 MORBID OBESITY WITH BMI OF 45.0-49.9, ADULT: ICD-10-CM

## 2019-12-23 DIAGNOSIS — E11.65 UNCONTROLLED TYPE 2 DIABETES MELLITUS WITH HYPERGLYCEMIA: ICD-10-CM

## 2019-12-23 DIAGNOSIS — I15.2 HYPERTENSION DUE TO ENDOCRINE DISORDER: ICD-10-CM

## 2019-12-23 DIAGNOSIS — Z71.3 NUTRITIONAL COUNSELING: ICD-10-CM

## 2019-12-23 PROCEDURE — 99999 PR PBB SHADOW E&M-EST. PATIENT-LVL II: ICD-10-PCS | Mod: PBBFAC,,, | Performed by: DIETITIAN, REGISTERED

## 2019-12-23 PROCEDURE — 99499 NO LOS: ICD-10-PCS | Mod: S$GLB,,, | Performed by: DIETITIAN, REGISTERED

## 2019-12-23 PROCEDURE — 99499 UNLISTED E&M SERVICE: CPT | Mod: S$GLB,,, | Performed by: DIETITIAN, REGISTERED

## 2019-12-23 PROCEDURE — 97803 MED NUTRITION INDIV SUBSEQ: CPT | Mod: S$GLB,,, | Performed by: DIETITIAN, REGISTERED

## 2019-12-23 PROCEDURE — 99999 PR PBB SHADOW E&M-EST. PATIENT-LVL II: CPT | Mod: PBBFAC,,, | Performed by: DIETITIAN, REGISTERED

## 2019-12-23 PROCEDURE — 97803 PR MED NUTR THER, SUBSQ, INDIV, EA 15 MIN: ICD-10-PCS | Mod: S$GLB,,, | Performed by: DIETITIAN, REGISTERED

## 2019-12-23 NOTE — PROGRESS NOTES
"NUTRITION NOTE    Referring Physician: Raciel Bonilla M.D.  Reason for MNT Referral: Medically Supervised Diet pending  Gastric Sleeve    Patient presents for 2nd visit for MSD with weight loss over the past month; total weight loss by making numerous dietary and lifestyle changes.  Cut back on fried foods, cut back on Snapple, cutting back on starchy carbs    CLINICAL DATA:  34 y.o. female.    Current Weight: 285.5  lbs  Weight Change Since Initial Visit: Loss of 3.5  lbs  Ideal Body Weight: 141 lbs  Body mass index is 47.51 kg/m².   Height: 5'5"  Last Weight: 289 lbs       DAILY NUTRITIONAL NEEDS:   1429 Calories (using [place] [address] Jeor Equation  BMR= 2024 x AF 1.2= 2429 -1000 calories for wt loss)   76-96 Grams Protein (1.2-1.5 gm/kg IBW/d)      Current diet recall:   Breakfast: half a bagel and just crack an egg coffee with decreased sugar and 2 creamer caramel macchino- 32 ounces  Snack: Banana or apple  Lunch: Leftover ie baked pork chop supriya greens and yellow rice or burger with side salad instead of fries  Dinner: protein and vegetables and some starchy carbs    Diet Includes: 3 meals and 1 healthy snack per day  Meal Pattern: Improved.  Protein Supplements: 0 per day.  Snacking: Adequate. Snacks include healthy choices.  Vegetables: Likes a variety. Eats daily.  Fruits: Likes a variety. Eats daily.  Beverages: water, sugary beverages and coffee with sugar  Dining Out:  Monthly. Mostly take-out.  Cooking at home: Daily. Mostly baked and grilled meat, fish, starchy CHO and vegetables.    CURRENT EXERCISE:  Adequate    Vitamins / Minerals / Herbs:   Vitamin D  Multivitamin      Food Allergies:   None reported    Social:  Works regular daytime shifts.  Alcohol: twice a month.  Smoking: Last time smoked was Dec 3, 2019    ASSESSMENT:  Patient demonstrates some willingness to change lifestyle habits as evidenced by weight loss, good exercise, dietary changes, reduced dining out, more food preparation at " [facility], healthier cooking at home and healthier snacking at home.    Doing fairly well with working on greatest challenges (irregular meal patterns).    Barriers to Education:  none  Stage of Change:  action    NUTRITION DIAGNOSIS:  Obesity related to Excessive carbohydrate intake as evidence by BMI.  Status: Improved    PLAN:  Pt is good candidate for surgery.    Diet: Adjust diet plan.  Work on Bariatric Nutrition Checklist.  Work on gradually cutting back on starchy CHO in the diet.  5-6 meals per day.  Start including protein supplements in the diet plan daily.  Return to clinic.    Exercise: Maintain.    Behavior Modification: Begin to document food & activity logs daily.    Return to clinic in one month.  Needs additional visit(s) with RD.    Communicated nutrition plan with bariatric team.    SESSION TIME:  30 minutes

## 2020-01-06 ENCOUNTER — TELEPHONE (OUTPATIENT)
Dept: BARIATRICS | Facility: CLINIC | Age: 35
End: 2020-01-06

## 2020-01-06 NOTE — TELEPHONE ENCOUNTER
Called, no answer, sherrie  with contact info----- Message from Nevaeh Hunt sent at 1/6/2020  1:44 PM CST -----  Contact: Pt   Reason: Pt calling to reschedule appt     Communication: [phone]

## 2020-01-07 ENCOUNTER — PATIENT MESSAGE (OUTPATIENT)
Dept: BARIATRICS | Facility: CLINIC | Age: 35
End: 2020-01-07

## 2020-01-09 ENCOUNTER — CLINICAL SUPPORT (OUTPATIENT)
Dept: BARIATRICS | Facility: CLINIC | Age: 35
End: 2020-01-09
Payer: COMMERCIAL

## 2020-01-09 VITALS — BODY MASS INDEX: 47.88 KG/M2 | WEIGHT: 287.69 LBS

## 2020-01-09 DIAGNOSIS — E11.65 UNCONTROLLED TYPE 2 DIABETES MELLITUS WITH HYPERGLYCEMIA: ICD-10-CM

## 2020-01-09 DIAGNOSIS — Z71.3 NUTRITIONAL COUNSELING: ICD-10-CM

## 2020-01-09 PROCEDURE — 97803 MED NUTRITION INDIV SUBSEQ: CPT | Mod: S$GLB,,, | Performed by: DIETITIAN, REGISTERED

## 2020-01-09 PROCEDURE — 99499 NO LOS: ICD-10-PCS | Mod: S$GLB,,, | Performed by: DIETITIAN, REGISTERED

## 2020-01-09 PROCEDURE — 99999 PR PBB SHADOW E&M-EST. PATIENT-LVL II: ICD-10-PCS | Mod: PBBFAC,,, | Performed by: DIETITIAN, REGISTERED

## 2020-01-09 PROCEDURE — 99999 PR PBB SHADOW E&M-EST. PATIENT-LVL II: CPT | Mod: PBBFAC,,, | Performed by: DIETITIAN, REGISTERED

## 2020-01-09 PROCEDURE — 97803 PR MED NUTR THER, SUBSQ, INDIV, EA 15 MIN: ICD-10-PCS | Mod: S$GLB,,, | Performed by: DIETITIAN, REGISTERED

## 2020-01-09 PROCEDURE — 99499 UNLISTED E&M SERVICE: CPT | Mod: S$GLB,,, | Performed by: DIETITIAN, REGISTERED

## 2020-01-09 NOTE — PROGRESS NOTES
NUTRITION NOTE    Referring Physician: Raciel Bonilla M.D.  Reason for MNT Referral: Medically Supervised Diet pending Gastric Sleeve    Patient presents for 3rd visit for MSD with weight gain over the past month; total weight loss by making numerous dietary and lifestyle changes.  Holidays was difficult due her 's dad passing away on [place] yulisa.  Pt reports she has not been making poor choices with foods and beverages but believes the stress over the holiday has contributed to weight gain    CLINICAL DATA:  34 y.o. female.    Current Weight: 288 lbs  Weight Change Since Initial Visit: Loss of 1 lbs  Ideal Body Weight: 141 lbs  There is no height or weight on file to calculate BMI.   Last Weight: 285.5  lbs  Pre-surgery Weight: 289 lbs        DAILY NUTRITIONAL NEEDS:   1429 Calories (using [place] [address] Jeor Equation  BMR= 2024 x AF 1.2= 2429 -1000 calories for wt loss)   76-96 Grams Protein (1.2-1.5 gm/kg IBW/d)      Current diet recall:   Breakfast: half a bagel and just crack an egg coffee with decreased sugar and 2 creamer caramel macchino- 32 ounces  Snack: Banana or apple  Lunch: Leftover ie baked pork chop supriya greens and yellow rice or burger with side salad instead of fries  Dinner: protein and vegetables and some starchy carbs       CURRENT DIET:  Reduced-calorie diet.  Diet Recall: Food records are not present.    Diet Includes: 3 meals mostly high protein low carb low fat  Meal Pattern: Irregular. Due to father-in-law passing on Christmas  Protein Supplements: 0 per day.  Snacking: Adequate. Snacks include healthy choices.  Vegetables: Likes a variety. Eats daily.  Fruits: Likes a few. Eats 2-3 times per week.  Beverages: water and Snapple  Dining Out:  Monthly. less than once a week Mostly take-out.  Cooking at home: Weekly. 5 days Mostly baked meat, vegetables and decreased starchy carbs.    CURRENT EXERCISE:  Adequate  Walking 10k per day    Vitamins / Minerals / Herbs:   Vitamin  D  Multivitamin   Super B complex    Food Allergies:   None reported    Social:  Works regular daytime shifts.  Alcohol: Socially.  Smoking: None.    ASSESSMENT:  Patient demonstrates some willingness to change lifestyle habits as evidenced by increased fruits, increased vegetables, healthier cooking at home, healthier snacking at work and healthier snacking at home.    Doing poorly with working on greatest challenges (irregular meal patterns).    Barriers to Education:  none  Stage of Change:  determination    NUTRITION DIAGNOSIS:  Obesity related to Excessive carbohydrate intake and Physical inactivity as evidence by BMI.  Status: Same    PLAN:  Pt is good  candidate for surgery.    Diet: Adjust diet plan.  Work on gradually cutting back on starchy CHO in the diet.  Start including protein supplements in the diet plan daily.  Return to clinic.    Exercise: Increase.    Behavior Modification: Begin to document food & activity logs daily.        Communicated nutrition plan with bariatric team.    SESSION TIME:  30 minutes

## 2020-01-30 ENCOUNTER — TELEPHONE (OUTPATIENT)
Dept: BARIATRICS | Facility: CLINIC | Age: 35
End: 2020-01-30

## 2020-01-30 ENCOUNTER — PATIENT MESSAGE (OUTPATIENT)
Dept: BARIATRICS | Facility: CLINIC | Age: 35
End: 2020-01-30

## 2020-01-30 DIAGNOSIS — E11.65 UNCONTROLLED TYPE 2 DIABETES MELLITUS WITH HYPERGLYCEMIA: ICD-10-CM

## 2020-01-30 DIAGNOSIS — Z87.891 SMOKER WITHIN LAST 12 MONTHS: Primary | ICD-10-CM

## 2020-01-30 NOTE — TELEPHONE ENCOUNTER
----- Message from Irma Jackson RD sent at 1/30/2020 12:11 PM CST -----  Regarding: nicotine lad draw order  Tigre Redd,  Can you put in an order for a blood draw for nicotine?  She is getting her vitamin D rechecked on Feb 14 and we can link the orders.  Thanks  Irma

## 2020-01-30 NOTE — TELEPHONE ENCOUNTER
Per JR, pt does not need 6 months of bariatric counseling since she started program in 2019.   Pt needs nicotine and A1C added to lab draw on 2/14/2020.  Will inform nursing and SUNITA

## 2020-02-14 ENCOUNTER — LAB VISIT (OUTPATIENT)
Dept: LAB | Facility: HOSPITAL | Age: 35
End: 2020-02-14
Payer: COMMERCIAL

## 2020-02-14 DIAGNOSIS — E11.65 UNCONTROLLED TYPE 2 DIABETES MELLITUS WITH HYPERGLYCEMIA: ICD-10-CM

## 2020-02-14 DIAGNOSIS — Z87.891 SMOKER WITHIN LAST 12 MONTHS: ICD-10-CM

## 2020-02-14 DIAGNOSIS — E11.65 UNCONTROLLED TYPE 2 DIABETES MELLITUS WITH HYPERGLYCEMIA: Primary | ICD-10-CM

## 2020-02-14 DIAGNOSIS — E55.9 VITAMIN D INSUFFICIENCY: ICD-10-CM

## 2020-02-14 LAB
25(OH)D3+25(OH)D2 SERPL-MCNC: 39 NG/ML (ref 30–96)
ESTIMATED AVG GLUCOSE: 249 MG/DL (ref 68–131)
HBA1C MFR BLD HPLC: 10.3 % (ref 4–5.6)

## 2020-02-14 PROCEDURE — 83036 HEMOGLOBIN GLYCOSYLATED A1C: CPT

## 2020-02-14 PROCEDURE — 36415 COLL VENOUS BLD VENIPUNCTURE: CPT

## 2020-02-14 PROCEDURE — 80323 ALKALOIDS NOS: CPT

## 2020-02-14 PROCEDURE — 82306 VITAMIN D 25 HYDROXY: CPT

## 2020-02-17 ENCOUNTER — TELEPHONE (OUTPATIENT)
Dept: BARIATRICS | Facility: CLINIC | Age: 35
End: 2020-02-17

## 2020-02-17 LAB
COTININE SERPL-MCNC: <3 NG/ML
NICOTINE SERPL-MCNC: <3 NG/ML

## 2020-02-17 NOTE — PROGRESS NOTES
Subjective:      Patient ID: Marlyn Kim is a 34 y.o. female.    Chief Complaint:  Diabetes    History of Present Illness  Marlyn Kim presents today for initial evaluation & management of Type 2 DM.     Needs A1c <8.5% for bariatric surgery.     With regards to the diabetes:    Diagnosed with Type 2 DM around 2017  Gestational diabetes in 2009 for first child. Did not have gestational diabetes in 2011 for second  Family History of Type 2 DM mother and sister  Known complications:  DKA/HHS: none  RN: due for eye exam, sees Dr. Nguyen-will try to obtain note  PN: denies  Nephropathy: +   Gastroparesis: -  CAD: -    Current regimen:  Metformin 1000 mg BID    Missed doses? -none    Other medications tried:  Trulicity in 2019 stopped after she ran out of refills in Nov 2019    Has glucometer at home. Checking once weekly  Log reviewed: none 160-200   Hypoglycemic event? Denies  Yes, did not need assistance   Knows how to correct with 15 grams of carbs- juice, coke, or a peppermint.     Dietary recall: She is doing bariatric surgery diet.   Eats 3 meals a day.   B: shake  L: taco  D: 1 cup of rice with green beans and pork chop  Snacks: not really snacking-sometimes beef jerky or cheese its  Drinks: water, gatorade zero, diet snapple  She gave up sodas and snapple in Nov 2019  She reports she changed her diet since noting her A1c.     Exercise - tried to stay active. She works day shift at [employer] -[id number] + steps daily     Education - last visit: saw bariatric education    Diabetes Management Status  Statin: Not taking  ACE/ARB: Taking    Denies family history of thyroid cancer. Denies personal history of pancreatitis.   Of note, she has history of UTIs and yeast infections but has not had in awhile.     Lab Results   Component Value Date    HGBA1C 10.3 (H) 02/14/2020    HGBA1C 8.7 (H) 11/20/2019    HGBA1C 10.0 (H) 03/18/2019     Screening or Prevention Patient's value Goal Complete/Controlled?   HgA1C  Testing and Control   Lab Results   Component Value Date    HGBA1C 10.3 (H) 02/14/2020      Annually/Less than 8% No   Lipid profile : 11/20/2019 Annually Yes   LDL control Lab Results   Component Value Date    LDLCALC 113.8 11/20/2019    Annually/Less than 100 mg/dl  No   Nephropathy screening Lab Results   Component Value Date    LABMICR 49.0 03/18/2019     Lab Results   Component Value Date    PROTEINUA Negative 06/05/2015    Annually Yes   Blood pressure BP Readings from Last 1 Encounters:   02/19/20 130/80    Less than 140/90 Yes   Dilated retinal exam Most Recent Eye Exam Date: Not Found Annually No   Foot exam   : 03/21/2019 Annually Yes       Review of Systems   Constitutional: Negative for fatigue.   Eyes: Negative for visual disturbance.   Respiratory: Negative for shortness of breath.    Cardiovascular: Negative for chest pain.   Gastrointestinal: Negative for abdominal pain.   Endocrine: Positive for polydipsia.   Musculoskeletal: Negative for arthralgias.   Skin: Negative for wound.   Neurological: Negative for headaches.   Hematological: Does not bruise/bleed easily.   Psychiatric/Behavioral: Negative for sleep disturbance.       Objective:   Physical Exam   Constitutional: She is oriented to person, place, and time. She appears well-developed. No distress.   HENT:   Right Ear: External ear normal.   Left Ear: External ear normal.   Hearing Normal     Neck: No tracheal deviation present. No thyromegaly present.   No nodules.   Cardiovascular: Normal rate.   No murmur heard.  No edema present   Pulmonary/Chest: Effort normal and breath sounds normal.   Abdominal: Soft. She exhibits no mass. No hernia.   Musculoskeletal: Normal range of motion. She exhibits no edema.   Neurological: She is alert and oriented to person, place, and time. No sensory deficit. Coordination normal.   Skin: No rash noted. No pallor.   Psychiatric: She has a normal mood and affect. Judgment normal.   Vitals reviewed.  Diabetes  "Foot Exam:   Denies sores or lesions to bilateral feet  Shoes appropriate  DM foot exam deferred; done 3/21/19      Visit Vitals  /80   Ht 5' 5" (1.651 m)   Wt 130 kg (286 lb 9.6 oz)   BMI 47.69 kg/m²        Lab Review:   Lab Results   Component Value Date    HGBA1C 10.3 (H) 02/14/2020    HGBA1C 8.7 (H) 11/20/2019    HGBA1C 10.0 (H) 03/18/2019      Lab Results   Component Value Date    CHOL 179 11/20/2019    HDL 40 11/20/2019    LDLCALC 113.8 11/20/2019    TRIG 126 11/20/2019    CHOLHDL 22.3 11/20/2019     Lab Results   Component Value Date     11/20/2019    K 4.0 11/20/2019     11/20/2019    CO2 25 11/20/2019     (H) 11/20/2019    BUN 9 11/20/2019    CREATININE 0.8 11/20/2019    CALCIUM 8.9 11/20/2019    PROT 7.4 11/20/2019    ALBUMIN 3.0 (L) 11/20/2019    BILITOT 0.4 11/20/2019    ALKPHOS 120 11/20/2019    AST 13 11/20/2019    ALT 11 11/20/2019    ANIONGAP 10 11/20/2019    ESTGFRAFRICA >60.0 11/20/2019    EGFRNONAA >60.0 11/20/2019    TSH 2.559 11/20/2019     Vit D, 25-Hydroxy   Date Value Ref Range Status   02/14/2020 39 30 - 96 ng/mL Final     Comment:     Vitamin D deficiency.........<10 ng/mL                              Vitamin D insufficiency......10-29 ng/mL       Vitamin D sufficiency........> or equal to 30 ng/mL  Vitamin D toxicity............>100 ng/mL       Assessment and Plan     1. Uncontrolled type 2 diabetes mellitus with hyperglycemia  Ambulatory referral/consult to Endocrinology    fluconazole (DIFLUCAN) 150 MG Tab    dulaglutide (TRULICITY) 0.75 mg/0.5 mL PnIj    empagliflozin (JARDIANCE) 10 mg tablet    Fructosamine    Comprehensive metabolic panel    Hemoglobin A1c    Lipid panel    Microalbumin/creatinine urine ratio   2. Hypertension due to endocrine disorder     3. Morbid obesity with BMI of 45.0-49.9, adult         Uncontrolled type 2 diabetes mellitus with hyperglycemia  -- Reviewed goals of therapy are to get the best control we can without hypoglycemia  -- " Discussed the quickest way for us to lower her BGs prior to bariatric surgery is with insulin but she would like to avoid. We will start medications below and she agrees to send log with BID BG checks in one week.  Medication changes:   Continue: Metformin 1000 mg BID  Restart Trulicity 0.75 mg weekly and increase to 1.5 mg weekly  Start Jardiance 10 mg daily  Discussed mechanism of action is to make her urinate out sugar. Discussed side effects include UTIs/yeast infections, Ro's gangrene, DKA, and orthostatic hypotension. Encouraged to call for intolerable side effects, instructed to drink at least 4-16 oz bottles of water daily, use good bathroom hygiene, eat at least 30 grams of carbs per each meal, call for persistent nausea, vomiting, diarrhea, and change positions carefully  Diflucan PRN given  -- Advised frequent self blood glucose monitoring.  Patient encouraged to document glucose results and bring them to every clinic visit    -- Hypoglycemia precautions discussed. Instructed on precautions before driving.    -- Call for Bg repeatedly < 90 or > 180.   -- Close adherence to lifestyle changes recommended.   -- Periodic follow ups for eye evaluations, foot care and dental care suggested.  -- Encouraged exercise per ADA guidelines of 150 minutes weekly.   -- given information on low carb diet and Dm drinks  -- Discussed plate method; encouraged to decrease carb intake to 1/4 cup with meals    Hypertension due to endocrine disorder  -- on ARB  -- Controlled  -- Blood pressure goals discussed with patient      Morbid obesity with BMI of 45.0-49.9, adult  -- Encouraged exercise as above  -- Discussed plate method  -- Resume Trulicity and start Jardiance as above -both of which can aid with weight loss.   -- Encouraged to work hard with diet and exercise to expedite bariatric surgery        Follow up in about 3 months (around 5/19/2020).    Case discussed with Dr. Mathews  Recommendations were discussed with  the patient in detail  The patient verbalized understanding and agrees with the plan outlined as above.

## 2020-02-17 NOTE — TELEPHONE ENCOUNTER
----- Message from ZAHRA Lyons sent at 2/14/2020 12:26 PM CST -----  Her A1c is well above goal for bariatric surgery. Please advise her to continue that I am referring her to endocrine or she can see Dr. Mckeon for assistance. Notify me if she would like to see bariatrician. Also, please route results to her stated PCP.

## 2020-02-17 NOTE — TELEPHONE ENCOUNTER
Phoned patient and notified her of elevated A1c and that it must be 8.5 or lower for surgery. Patient verbalized understanding. Appointment scheduled with endocrine for 2/19. Patient states she does not have a PCP, but is going to work on that today.

## 2020-02-19 ENCOUNTER — DOCUMENTATION ONLY (OUTPATIENT)
Dept: BARIATRICS | Facility: CLINIC | Age: 35
End: 2020-02-19

## 2020-02-19 ENCOUNTER — OFFICE VISIT (OUTPATIENT)
Dept: ENDOCRINOLOGY | Facility: CLINIC | Age: 35
End: 2020-02-19
Payer: COMMERCIAL

## 2020-02-19 VITALS
BODY MASS INDEX: 47.75 KG/M2 | DIASTOLIC BLOOD PRESSURE: 80 MMHG | HEIGHT: 65 IN | SYSTOLIC BLOOD PRESSURE: 130 MMHG | WEIGHT: 286.63 LBS

## 2020-02-19 DIAGNOSIS — I15.2 HYPERTENSION DUE TO ENDOCRINE DISORDER: ICD-10-CM

## 2020-02-19 DIAGNOSIS — E66.01 MORBID OBESITY WITH BMI OF 45.0-49.9, ADULT: ICD-10-CM

## 2020-02-19 DIAGNOSIS — E11.65 UNCONTROLLED TYPE 2 DIABETES MELLITUS WITH HYPERGLYCEMIA: ICD-10-CM

## 2020-02-19 PROCEDURE — 3075F SYST BP GE 130 - 139MM HG: CPT | Mod: CPTII,S$GLB,, | Performed by: NURSE PRACTITIONER

## 2020-02-19 PROCEDURE — 99204 PR OFFICE/OUTPT VISIT, NEW, LEVL IV, 45-59 MIN: ICD-10-PCS | Mod: S$GLB,,, | Performed by: NURSE PRACTITIONER

## 2020-02-19 PROCEDURE — 3008F BODY MASS INDEX DOCD: CPT | Mod: CPTII,S$GLB,, | Performed by: NURSE PRACTITIONER

## 2020-02-19 PROCEDURE — 3079F PR MOST RECENT DIASTOLIC BLOOD PRESSURE 80-89 MM HG: ICD-10-PCS | Mod: CPTII,S$GLB,, | Performed by: NURSE PRACTITIONER

## 2020-02-19 PROCEDURE — 99999 PR PBB SHADOW E&M-EST. PATIENT-LVL III: ICD-10-PCS | Mod: PBBFAC,,, | Performed by: NURSE PRACTITIONER

## 2020-02-19 PROCEDURE — 3075F PR MOST RECENT SYSTOLIC BLOOD PRESS GE 130-139MM HG: ICD-10-PCS | Mod: CPTII,S$GLB,, | Performed by: NURSE PRACTITIONER

## 2020-02-19 PROCEDURE — 3079F DIAST BP 80-89 MM HG: CPT | Mod: CPTII,S$GLB,, | Performed by: NURSE PRACTITIONER

## 2020-02-19 PROCEDURE — 99999 PR PBB SHADOW E&M-EST. PATIENT-LVL III: CPT | Mod: PBBFAC,,, | Performed by: NURSE PRACTITIONER

## 2020-02-19 PROCEDURE — 99204 OFFICE O/P NEW MOD 45 MIN: CPT | Mod: S$GLB,,, | Performed by: NURSE PRACTITIONER

## 2020-02-19 PROCEDURE — 3046F PR MOST RECENT HEMOGLOBIN A1C LEVEL > 9.0%: ICD-10-PCS | Mod: CPTII,S$GLB,, | Performed by: NURSE PRACTITIONER

## 2020-02-19 PROCEDURE — 3046F HEMOGLOBIN A1C LEVEL >9.0%: CPT | Mod: CPTII,S$GLB,, | Performed by: NURSE PRACTITIONER

## 2020-02-19 PROCEDURE — 3008F PR BODY MASS INDEX (BMI) DOCUMENTED: ICD-10-PCS | Mod: CPTII,S$GLB,, | Performed by: NURSE PRACTITIONER

## 2020-02-19 RX ORDER — FLUCONAZOLE 150 MG/1
150 TABLET ORAL DAILY
Qty: 10 TABLET | Refills: 1 | Status: SHIPPED | OUTPATIENT
Start: 2020-02-19 | End: 2020-02-20

## 2020-02-19 NOTE — PATIENT INSTRUCTIONS
Restart Trulicity 0.75 mg weekly and increase to 1.5 mg weekly  Start Jardiance 10 mg daily    Discussed mechanism of action is to make her urinate out sugar. Discussed side effects include UTIs/yeast infections, Ro's gangrene, DKA, and orthostatic hypotension. Encouraged to call for intolerable side effects, instructed to drink at least 4-16 oz bottles of water daily, use good bathroom hygiene, eat at least 30 grams of carbs per each meal, call for persistent nausea, vomiting, diarrhea, and change positions carefully    Diabetic drinks we recommend:   Water -BEST  Crystal light sugar free packets  Gatorade zero   Powerade zero  Tea without sweetener    Diabetic drinks we do not recommend:  Coke or any sugary regular soft drink  Coffee with sugar  Milk  Juice  Beer  Liquor    Sweeteners we recommend:  Stevтатьяна Storey    Note: Caffeine can increase your blood sugar     Snacks can be an important part of a balanced, healthy meal plan. They allow you to eat more frequently, feeling full and satisfied throughout the day. Also, they allow you to spread carbohydrates evenly, which may stabilize blood sugars.  Plus, snacks are enjoyable!     The amount of carbohydrate needed at snacks varies. Generally, about 15-30 grams of carbohydrate per snack is recommended.  Below you will find some tasty treats.       0-5 gm carb   Crystal Light   Vitamin Water Zero   Herbal tea, unsweetened   2 tsp peanut butter on celery   1./2 cup sugar-free jell-o   1 sugar-free popsicle   ¼ cup blueberries   8oz Blue Destiny unsweetened almond milk   5 baby carrots & celery sticks, cucumbers, bell peppers dipped in ¼ cup salsa, 2Tbsp light ranch dressing or 2Tbsp plain Greek yogurt   10 Goldfish crackers   ½ oz low-fat cheese or string cheese   1 closed handful of nuts, unsalted   1 Tbsp of sunflower seeds, unsalted   1 cup Smart Pop popcorn   1 whole grain brown rice cake        15 gm carb   1 small piece of fruit  or ½ banana or 1/2 cup lite canned fruit   3 vale cracker squares   3 cups Smart Pop popcorn, top spray butter, Herrera lite salt or cinnamon and Truvia   5 Vanilla Wafers   ½ cup low fat, no added sugar ice cream or frozen yogurt (Blue bell, Blue Bunny, Weight Watchers, Skinny Cow)   ½ turkey, ham, or chicken sandwich   ½ c fruit with ½ c Cottage cheese   4-6 unsalted wheat crackers with 1 oz low fat cheese or 1 tbsp peanut butter    30-45 goldfish crackers (depending on flavor)    7-8 Evangelical mini brown rice cakes (caramel, apple cinnamon, chocolate)    12 Evangelical mini brown rice cakes (cheddar, bbq, ranch)    1/3 cup hummus dip with raw veg   1/2 whole wheat paramjit, 1Tbsp hummus   Mini Pizza (1/2 whole wheat English muffin, low-fat  cheese, tomato sauce)   100 calorie snack pack (Oreo, Chips Ahoy, Ritz Mix, Baked Cheetos)   4-6 oz. light or Greek Style yogurt (Chobani, Yoplait, Okios, Stoneyfield)   ½ cup sugar-free pudding     6 in. wheat tortilla or paramjit oven toasted chips (topped with spray butter flavoring, cinnamon, Truvia OR spray butter, garlic powder, chili powder)    18 BBQ Popchips (available at [employer], [employer], Fresh Market)

## 2020-02-19 NOTE — ASSESSMENT & PLAN NOTE
-- Reviewed goals of therapy are to get the best control we can without hypoglycemia  -- Discussed the quickest way for us to lower her BGs prior to bariatric surgery is with insulin but she would like to avoid. We will start medications below and she agrees to send log with BID BG checks in one week.  Medication changes:   Continue: Metformin 1000 mg BID  Restart Trulicity 0.75 mg weekly and increase to 1.5 mg weekly  Start Jardiance 10 mg daily  Discussed mechanism of action is to make her urinate out sugar. Discussed side effects include UTIs/yeast infections, Ro's gangrene, DKA, and orthostatic hypotension. Encouraged to call for intolerable side effects, instructed to drink at least 4-16 oz bottles of water daily, use good bathroom hygiene, eat at least 30 grams of carbs per each meal, call for persistent nausea, vomiting, diarrhea, and change positions carefully  Diflucan PRN given  -- Advised frequent self blood glucose monitoring.  Patient encouraged to document glucose results and bring them to every clinic visit    -- Hypoglycemia precautions discussed. Instructed on precautions before driving.    -- Call for Bg repeatedly < 90 or > 180.   -- Close adherence to lifestyle changes recommended.   -- Periodic follow ups for eye evaluations, foot care and dental care suggested.  -- Encouraged exercise per ADA guidelines of 150 minutes weekly.   -- given information on low carb diet and Dm drinks  -- Discussed plate method; encouraged to decrease carb intake to 1/4 cup with meals

## 2020-02-19 NOTE — ASSESSMENT & PLAN NOTE
-- Encouraged exercise as above  -- Discussed plate method  -- Resume Trulicity and start Jardiance as above -both of which can aid with weight loss.   -- Encouraged to work hard with diet and exercise to expedite bariatric surgery

## 2020-02-19 NOTE — LETTER
February 19, 2020      ZAHRA Lyons  [address]  [facility] [address]           [facility] [place]  [address]  [facility] [address]  Phone: [phone]  Fax: [phone]          Patient: Marlyn Kim   MR Number: [id number]   YOB: 1985   Date of Visit: 2/19/2020       Dear Ivania Moise:    Thank you for referring Marlyn Kim to me for evaluation. Attached you will find relevant portions of my assessment and plan of care.    If you have questions, please do not hesitate to call me. I look forward to following Marlyn Kim along with you.    Sincerely,    Vale Lundy, NP    Enclosure  CC:  No Recipients    If you would like to receive this communication electronically, please contact [email or web address] or [phone] to request more information on [employer] Link access.    For providers and/or their staff who would like to refer a patient to [facility], please contact us through our one-stop-shop provider referral line, [facility], at [phone].    If you feel you have received this communication in error or would no longer like to receive these types of communications, please e-mail [email or web address]

## 2020-02-20 ENCOUNTER — PATIENT MESSAGE (OUTPATIENT)
Dept: ENDOCRINOLOGY | Facility: CLINIC | Age: 35
End: 2020-02-20

## 2020-02-20 ENCOUNTER — OFFICE VISIT (OUTPATIENT)
Dept: INTERNAL MEDICINE | Facility: CLINIC | Age: 35
End: 2020-02-20
Payer: COMMERCIAL

## 2020-02-20 VITALS
HEART RATE: 87 BPM | SYSTOLIC BLOOD PRESSURE: 130 MMHG | BODY MASS INDEX: 48.15 KG/M2 | WEIGHT: 289 LBS | HEIGHT: 65 IN | DIASTOLIC BLOOD PRESSURE: 88 MMHG | OXYGEN SATURATION: 99 %

## 2020-02-20 DIAGNOSIS — I10 ESSENTIAL HYPERTENSION: ICD-10-CM

## 2020-02-20 DIAGNOSIS — E11.65 TYPE 2 DIABETES MELLITUS WITH HYPERGLYCEMIA, WITHOUT LONG-TERM CURRENT USE OF INSULIN: ICD-10-CM

## 2020-02-20 DIAGNOSIS — Z00.00 ROUTINE GENERAL MEDICAL EXAMINATION AT A HEALTH CARE FACILITY: Primary | ICD-10-CM

## 2020-02-20 DIAGNOSIS — E66.01 MORBID OBESITY WITH BMI OF 45.0-49.9, ADULT: ICD-10-CM

## 2020-02-20 PROBLEM — D50.9 IRON DEFICIENCY ANEMIA: Status: ACTIVE | Noted: 2020-02-20

## 2020-02-20 PROBLEM — E66.9 SUPER OBESITY: Status: RESOLVED | Noted: 2019-03-21 | Resolved: 2020-02-20

## 2020-02-20 PROBLEM — I15.2 HYPERTENSION DUE TO ENDOCRINE DISORDER: Status: RESOLVED | Noted: 2019-03-21 | Resolved: 2020-02-20

## 2020-02-20 PROBLEM — K02.9 CARIES: Status: RESOLVED | Noted: 2018-03-05 | Resolved: 2020-02-20

## 2020-02-20 PROCEDURE — 99395 PREV VISIT EST AGE 18-39: CPT | Mod: S$GLB,,, | Performed by: STUDENT IN AN ORGANIZED HEALTH CARE EDUCATION/TRAINING PROGRAM

## 2020-02-20 PROCEDURE — 3075F SYST BP GE 130 - 139MM HG: CPT | Mod: CPTII,S$GLB,, | Performed by: STUDENT IN AN ORGANIZED HEALTH CARE EDUCATION/TRAINING PROGRAM

## 2020-02-20 PROCEDURE — 3079F PR MOST RECENT DIASTOLIC BLOOD PRESSURE 80-89 MM HG: ICD-10-PCS | Mod: CPTII,S$GLB,, | Performed by: STUDENT IN AN ORGANIZED HEALTH CARE EDUCATION/TRAINING PROGRAM

## 2020-02-20 PROCEDURE — 3046F HEMOGLOBIN A1C LEVEL >9.0%: CPT | Mod: CPTII,S$GLB,, | Performed by: STUDENT IN AN ORGANIZED HEALTH CARE EDUCATION/TRAINING PROGRAM

## 2020-02-20 PROCEDURE — 99395 PR PREVENTIVE VISIT,EST,18-39: ICD-10-PCS | Mod: S$GLB,,, | Performed by: STUDENT IN AN ORGANIZED HEALTH CARE EDUCATION/TRAINING PROGRAM

## 2020-02-20 PROCEDURE — 99999 PR PBB SHADOW E&M-EST. PATIENT-LVL V: ICD-10-PCS | Mod: PBBFAC,,, | Performed by: STUDENT IN AN ORGANIZED HEALTH CARE EDUCATION/TRAINING PROGRAM

## 2020-02-20 PROCEDURE — 3046F PR MOST RECENT HEMOGLOBIN A1C LEVEL > 9.0%: ICD-10-PCS | Mod: CPTII,S$GLB,, | Performed by: STUDENT IN AN ORGANIZED HEALTH CARE EDUCATION/TRAINING PROGRAM

## 2020-02-20 PROCEDURE — 99999 PR PBB SHADOW E&M-EST. PATIENT-LVL V: CPT | Mod: PBBFAC,,, | Performed by: STUDENT IN AN ORGANIZED HEALTH CARE EDUCATION/TRAINING PROGRAM

## 2020-02-20 PROCEDURE — 3075F PR MOST RECENT SYSTOLIC BLOOD PRESS GE 130-139MM HG: ICD-10-PCS | Mod: CPTII,S$GLB,, | Performed by: STUDENT IN AN ORGANIZED HEALTH CARE EDUCATION/TRAINING PROGRAM

## 2020-02-20 PROCEDURE — 3079F DIAST BP 80-89 MM HG: CPT | Mod: CPTII,S$GLB,, | Performed by: STUDENT IN AN ORGANIZED HEALTH CARE EDUCATION/TRAINING PROGRAM

## 2020-02-20 RX ORDER — AMLODIPINE BESYLATE 10 MG/1
TABLET ORAL
Qty: 90 TABLET | Refills: 6 | Status: SHIPPED | OUTPATIENT
Start: 2020-02-20 | End: 2021-02-11 | Stop reason: SDUPTHER

## 2020-02-20 RX ORDER — LOSARTAN POTASSIUM 50 MG/1
50 TABLET ORAL DAILY
Qty: 90 TABLET | Refills: 6 | Status: SHIPPED | OUTPATIENT
Start: 2020-02-20 | End: 2021-02-11

## 2020-02-20 NOTE — PATIENT INSTRUCTIONS
You were seen today for annual visit.     Labs with Endocrine in 1 month    Will refill Losartan and Amlodipine today to 90 day supply       Return in 6 months

## 2020-02-20 NOTE — PROGRESS NOTES
Marlyn Kim  1985        Subjective     Chief Complaint:    Ms. Marlyn Kim is a very pleasant 34 y.o. female who is here to establish care. She has a medical history of Iron-deficiency anemia, DM2 and HTN. She has been following with Bariatric Surgery for possible gastric sleeve placement. She has met with their bariatric nutritionist who has recommended a daily calorie limit and correct portioning of carbs/protein per meal. On pre-surgical labs, her A1c was noted to be 10.3 and she was sent to Endocrinology for management. Per pt, the bariatric team would like her A1c down to at least 8.0 before they would proceed with surgery. Was taking Metformin 1000 mg daily, states she had been on Trulicity in the past but had not been taking it in about a year due to trouble refilling medication and not being able to see doctor due to work schedule. Has not been monitoring her blood sugars or BP at home as much as she knows she should. She was seen by Endocrinology yesterday who re-started Trulicity as well as started Jardiance (in addition to Metformin 1000 mg daily). Taking Amlodipine 10 mg and Losartan 50 mg for BP.   She is very motivated to have this surgery and try and reduce some of her medications as well as minimize risk for future cardiovascular complications. She states her brother recently had MI at age 40y and her mother has hx of quadruple bypass surgery for MI. She is due for yearly eye exam and will schedule it this year. Also would like referral for ob/gyn for pap smears.     Review of Systems   Constitutional: Negative for chills and fever.   HENT: Negative for hearing loss.    Eyes: Negative for discharge.   Respiratory: Negative for wheezing.    Cardiovascular: Negative for chest pain and palpitations.   Gastrointestinal: Negative for abdominal pain, blood in stool, constipation, diarrhea, heartburn, nausea and vomiting.   Genitourinary: Negative for dysuria and hematuria.    Musculoskeletal: Negative for neck pain.   Neurological: Negative for weakness and headaches.   Endo/Heme/Allergies: Positive for polydipsia.   Psychiatric/Behavioral: Negative for depression.        PAST HISTORY:     Past Medical History:   Diagnosis Date    BMI 45.0-49.9, adult     Diabetes mellitus     Hypertension     Obesity        Past Surgical History:   Procedure Laterality Date     SECTION      CHOLECYSTECTOMY      TUBAL LIGATION Bilateral 2015       Family History   Problem Relation Age of Onset    Diabetes Mother     Heart disease Mother         s/p 4 stents    Hypertension Mother     Multiple sclerosis Mother     Diabetes Sister     Hypertension Brother     Heart attack Brother 40    Cancer Neg Hx     Stroke Neg Hx        Social History     Socioeconomic History    Marital status:      Spouse name: Not on file    Number of children: Not on file    Years of education: Not on file    Highest education level: Not on file   Occupational History    Not on file   Social Needs    Financial resource strain: Not on file    Food insecurity:     Worry: Not on file     Inability: Not on file    Transportation needs:     Medical: No     Non-medical: No   Tobacco Use    Smoking status: Former Smoker     Packs/day: 0.25     Types: Cigarettes     Start date: 2019    Smokeless tobacco: Never Used    Tobacco comment: Every other day smokes about two cigs   Substance and Sexual Activity    Alcohol use: Yes     Frequency: 2-4 times a month     Drinks per session: 1 or 2     Binge frequency: Never     Comment: Socially    Drug use: Never    Sexual activity: Yes     Partners: Male     Birth control/protection: Surgical   Lifestyle    Physical activity:     Days per week: 0 days     Minutes per session: 0 min    Stress: Not at all   Relationships    Social connections:     Talks on phone: Twice a week     Gets together: Patient refused     Attends Denominational service:  "Not on file     Active member of club or organization: No     Attends meetings of clubs or organizations: Never     Relationship status:    Other Topics Concern    Not on file   Social History Narrative    Not on file       MEDICATIONS & ALLERGIES:     Current Outpatient Medications on File Prior to Visit   Medication Sig    blood sugar diagnostic Strp USE TO TEST BLOOD SUGAR TWICE DAILY    blood-glucose meter kit USE TO TEST BLOOD SUGAR TWICE DAILY    dulaglutide (TRULICITY) 0.75 mg/0.5 mL PnIj Inject 0.5 mLs (0.75 mg total) into the skin every 7 days for 14 days, THEN 1 mL (1.5 mg total) every 7 days for 14 days. Start with Trulicity 0.75 mg weekly for 2 weeks then increase to 1.5 mg weekly thereafter.    empagliflozin (JARDIANCE) 10 mg tablet Take 1 tablet (10 mg total) by mouth once daily.    fluconazole (DIFLUCAN) 150 MG Tab Take 1 tablet (150 mg total) by mouth once daily. for 1 day    lancets 28 gauge Misc USE TO TEST BLOOD SUGAR TWICE DAILY    metFORMIN (GLUCOPHAGE-XR) 500 MG 24 hr tablet Take 2 tablets (1,000 mg total) by mouth Daily.    [DISCONTINUED] amLODIPine (NORVASC) 10 MG tablet TK ONE T PO QD.    [DISCONTINUED] ergocalciferol (ERGOCALCIFEROL) 50,000 unit Cap Take 1 capsule (50,000 Units total) by mouth twice a week.    [DISCONTINUED] losartan (COZAAR) 50 MG tablet Take 1 tablet (50 mg total) by mouth once daily.    [DISCONTINUED] omeprazole (PRILOSEC) 20 MG capsule Take 1 capsule (20 mg total) by mouth 2 (two) times daily. Take with amoxil and biaxin for 14 days     No current facility-administered medications on file prior to visit.        Review of patient's allergies indicates:   Allergen Reactions    Ciprofloxacin        OBJECTIVE:     Vital Signs:  Vitals:    02/20/20 0901   BP: 130/88   BP Location: Left arm   Patient Position: Sitting   BP Method: X-Large (Manual)   Pulse: 87   SpO2: 99%   Weight: 131.1 kg (289 lb 0.4 oz)   Height: 5' 5" (1.651 m)       Body mass index is " 48.1 kg/m².     Physical Exam:  Physical Exam   Constitutional: She is oriented to person, place, and time and well-developed, well-nourished, and in no distress. No distress.   HENT:   Head: Normocephalic and atraumatic.   Mouth/Throat: Oropharynx is clear and moist. No oropharyngeal exudate.   Eyes: Conjunctivae and EOM are normal. No scleral icterus.   Neck: Normal range of motion. Neck supple. No thyromegaly present.   Cardiovascular: Normal rate, regular rhythm and normal heart sounds.   No murmur heard.  Pulmonary/Chest: Effort normal and breath sounds normal. No respiratory distress. She has no wheezes. She has no rales.   Abdominal: Soft. She exhibits no distension. There is no tenderness.   Musculoskeletal: Normal range of motion. She exhibits no edema, tenderness or deformity.   Lymphadenopathy:     She has no cervical adenopathy.   Neurological: She is alert and oriented to person, place, and time. Gait normal. Coordination normal. GCS score is 15.   Foot exam done-6 sites tested and sensed on each foot.    Skin: Skin is warm and dry. No rash noted. She is not diaphoretic. No erythema.   Psychiatric: Affect normal.   Nursing note and vitals reviewed.         Laboratory  Lab Results   Component Value Date    WBC 8.19 11/20/2019    HGB 10.2 (L) 11/20/2019    HCT 32.5 (L) 11/20/2019    MCV 70 (L) 11/20/2019     11/20/2019     Lab Results   Component Value Date     (H) 11/20/2019     11/20/2019    K 4.0 11/20/2019     11/20/2019    CO2 25 11/20/2019    BUN 9 11/20/2019    CREATININE 0.8 11/20/2019    CALCIUM 8.9 11/20/2019    MG 1.6 11/20/2019     Lab Results   Component Value Date    INR 0.9 06/06/2012     Lab Results   Component Value Date    HGBA1C 10.3 (H) 02/14/2020     No results for input(s): POCTGLUCOSE in the last 72 hours.      ASSESSMENT & PLAN:   Ms. Marlyn Kim is a 34 y.o. female who was seen today for establish care visit.    Diagnoses and all orders for this  visit:    Routine general medical examination at a health care facility  -     Ambulatory referral/consult to Obstetrics / Gynecology; Future- for pap smears  -     Declines tetanus and pneumonia vaccine today  -     Declines HIV testing today    Essential hypertension  -     amLODIPine (NORVASC) 10 MG tablet;  -     Refill Amlodipine and changed to 90 day supply   -     Continue with Amlodipine and Losartan for BP control  -     BP today was 130/88    Type 2 diabetes mellitus with hyperglycemia, without long-term current use of insulin  -     losartan (COZAAR) 50 MG tablet; Take 1 tablet (50 mg total) by mouth once daily.  -     Recent A1c 10.3-pt was not taking Trulicity past 1 year  -     Seen by Endocrinology yesterday 4/19, re-started on Trulicity as well as started on Jardiance (with Metformin 1000mg daily)  -     Pt states they are monitoring with monthly labs (urine microalbumin, Lipid Panel, A1c, CMP)  -     Foot exam done today  -     [place] exam, [place] [facility] [place]    Morbid obesity with BMI of 45.0-49.9, adult        -    Followed by Bariatric Surgery for possible gastric sleeve, surgery pending A1c control       RTC in 6 months     Joyce Marie MD  Internal Medicine PGY1  [facility]  [address] [place] [address] [id number] [phone]    History of Present Illness:Answers for HPI/ROS submitted by the patient on 2/20/2020   activity change: No  unexpected weight change: No  rhinorrhea: No  trouble swallowing: No  visual disturbance: No  chest tightness: No  polyuria: No  difficulty urinating: No  menstrual problem: No  joint swelling: No  arthralgias: No  confusion: No  dysphoric mood: No

## 2020-02-21 ENCOUNTER — PATIENT MESSAGE (OUTPATIENT)
Dept: ENDOCRINOLOGY | Facility: CLINIC | Age: 35
End: 2020-02-21

## 2020-02-27 ENCOUNTER — PATIENT MESSAGE (OUTPATIENT)
Dept: ENDOCRINOLOGY | Facility: CLINIC | Age: 35
End: 2020-02-27

## 2020-02-28 ENCOUNTER — PATIENT MESSAGE (OUTPATIENT)
Dept: INTERNAL MEDICINE | Facility: CLINIC | Age: 35
End: 2020-02-28

## 2020-02-28 ENCOUNTER — PATIENT MESSAGE (OUTPATIENT)
Dept: ENDOCRINOLOGY | Facility: CLINIC | Age: 35
End: 2020-02-28

## 2020-03-04 DIAGNOSIS — E11.65 UNCONTROLLED TYPE 2 DIABETES MELLITUS WITH HYPERGLYCEMIA: ICD-10-CM

## 2020-04-04 ENCOUNTER — PATIENT MESSAGE (OUTPATIENT)
Dept: INTERNAL MEDICINE | Facility: CLINIC | Age: 35
End: 2020-04-04

## 2020-04-04 DIAGNOSIS — E11.8 TYPE 2 DIABETES MELLITUS WITH COMPLICATION, WITH LONG-TERM CURRENT USE OF INSULIN: ICD-10-CM

## 2020-04-04 DIAGNOSIS — Z79.4 TYPE 2 DIABETES MELLITUS WITH COMPLICATION, WITH LONG-TERM CURRENT USE OF INSULIN: ICD-10-CM

## 2020-04-06 ENCOUNTER — TELEPHONE (OUTPATIENT)
Dept: ENDOCRINOLOGY | Facility: CLINIC | Age: 35
End: 2020-04-06

## 2020-04-07 RX ORDER — METFORMIN HYDROCHLORIDE 500 MG/1
1000 TABLET, EXTENDED RELEASE ORAL DAILY
Qty: 180 TABLET | Refills: 11 | Status: SHIPPED | OUTPATIENT
Start: 2020-04-07 | End: 2021-02-11 | Stop reason: SDUPTHER

## 2020-04-07 RX ORDER — LANCETS 28 GAUGE
EACH MISCELLANEOUS
Qty: 100 EACH | Refills: 6 | Status: SHIPPED | OUTPATIENT
Start: 2020-04-07 | End: 2021-02-11 | Stop reason: SDUPTHER

## 2020-04-21 DIAGNOSIS — Z01.84 ANTIBODY RESPONSE EXAMINATION: ICD-10-CM

## 2020-05-01 ENCOUNTER — PATIENT MESSAGE (OUTPATIENT)
Dept: ENDOCRINOLOGY | Facility: CLINIC | Age: 35
End: 2020-05-01

## 2020-05-21 DIAGNOSIS — Z01.84 ANTIBODY RESPONSE EXAMINATION: ICD-10-CM

## 2020-05-25 PROBLEM — Z00.00 ROUTINE GENERAL MEDICAL EXAMINATION AT A HEALTH CARE FACILITY: Status: RESOLVED | Noted: 2020-02-20 | Resolved: 2020-05-25

## 2020-05-26 ENCOUNTER — TELEPHONE (OUTPATIENT)
Dept: ENDOCRINOLOGY | Facility: CLINIC | Age: 35
End: 2020-05-26

## 2020-06-20 DIAGNOSIS — Z01.84 ANTIBODY RESPONSE EXAMINATION: ICD-10-CM

## 2020-07-20 DIAGNOSIS — Z01.84 ANTIBODY RESPONSE EXAMINATION: ICD-10-CM

## 2020-08-04 ENCOUNTER — TELEPHONE (OUTPATIENT)
Dept: INTERNAL MEDICINE | Facility: CLINIC | Age: 35
End: 2020-08-04

## 2020-08-04 NOTE — TELEPHONE ENCOUNTER
----- Message from Maribel Contreras sent at 8/4/2020 11:23 AM CDT -----  Contact: [phone]  Appointment Request From: Marlyn Kim    With Provider: Joyce Marie MD [facility]]    Preferred Date Range: 8/5/2020 - 8/6/2020    Preferred Times: Any Time    Reason for visit: Office Visit    Comments:  I have some hand pain and stiffness

## 2020-08-05 ENCOUNTER — OFFICE VISIT (OUTPATIENT)
Dept: INTERNAL MEDICINE | Facility: CLINIC | Age: 35
End: 2020-08-05
Payer: COMMERCIAL

## 2020-08-05 ENCOUNTER — HOSPITAL ENCOUNTER (OUTPATIENT)
Dept: RADIOLOGY | Facility: HOSPITAL | Age: 35
Discharge: HOME | End: 2020-08-05
Attending: STUDENT IN AN ORGANIZED HEALTH CARE EDUCATION/TRAINING PROGRAM
Payer: COMMERCIAL

## 2020-08-05 VITALS
WEIGHT: 272.94 LBS | BODY MASS INDEX: 45.42 KG/M2 | DIASTOLIC BLOOD PRESSURE: 86 MMHG | OXYGEN SATURATION: 99 % | SYSTOLIC BLOOD PRESSURE: 124 MMHG | HEART RATE: 80 BPM

## 2020-08-05 DIAGNOSIS — M79.644 THUMB PAIN, RIGHT: ICD-10-CM

## 2020-08-05 DIAGNOSIS — M79.644 THUMB PAIN, RIGHT: Primary | ICD-10-CM

## 2020-08-05 PROCEDURE — 99999 PR PBB SHADOW E&M-EST. PATIENT-LVL III: CPT | Mod: PBBFAC,,, | Performed by: STUDENT IN AN ORGANIZED HEALTH CARE EDUCATION/TRAINING PROGRAM

## 2020-08-05 PROCEDURE — 73130 XR HAND COMPLETE 3 VIEW RIGHT: ICD-10-PCS | Mod: 26,RT,, | Performed by: RADIOLOGY

## 2020-08-05 PROCEDURE — 99213 PR OFFICE/OUTPT VISIT, EST, LEVL III, 20-29 MIN: ICD-10-PCS | Mod: S$GLB,,, | Performed by: STUDENT IN AN ORGANIZED HEALTH CARE EDUCATION/TRAINING PROGRAM

## 2020-08-05 PROCEDURE — 73130 X-RAY EXAM OF HAND: CPT | Mod: TC,RT

## 2020-08-05 PROCEDURE — 73130 X-RAY EXAM OF HAND: CPT | Mod: 26,RT,, | Performed by: RADIOLOGY

## 2020-08-05 PROCEDURE — 3008F PR BODY MASS INDEX (BMI) DOCUMENTED: ICD-10-PCS | Mod: CPTII,S$GLB,, | Performed by: STUDENT IN AN ORGANIZED HEALTH CARE EDUCATION/TRAINING PROGRAM

## 2020-08-05 PROCEDURE — 3008F BODY MASS INDEX DOCD: CPT | Mod: CPTII,S$GLB,, | Performed by: STUDENT IN AN ORGANIZED HEALTH CARE EDUCATION/TRAINING PROGRAM

## 2020-08-05 PROCEDURE — 3074F SYST BP LT 130 MM HG: CPT | Mod: CPTII,S$GLB,, | Performed by: STUDENT IN AN ORGANIZED HEALTH CARE EDUCATION/TRAINING PROGRAM

## 2020-08-05 PROCEDURE — 99999 PR PBB SHADOW E&M-EST. PATIENT-LVL III: ICD-10-PCS | Mod: PBBFAC,,, | Performed by: STUDENT IN AN ORGANIZED HEALTH CARE EDUCATION/TRAINING PROGRAM

## 2020-08-05 PROCEDURE — 3079F PR MOST RECENT DIASTOLIC BLOOD PRESSURE 80-89 MM HG: ICD-10-PCS | Mod: CPTII,S$GLB,, | Performed by: STUDENT IN AN ORGANIZED HEALTH CARE EDUCATION/TRAINING PROGRAM

## 2020-08-05 PROCEDURE — 3079F DIAST BP 80-89 MM HG: CPT | Mod: CPTII,S$GLB,, | Performed by: STUDENT IN AN ORGANIZED HEALTH CARE EDUCATION/TRAINING PROGRAM

## 2020-08-05 PROCEDURE — 99213 OFFICE O/P EST LOW 20 MIN: CPT | Mod: S$GLB,,, | Performed by: STUDENT IN AN ORGANIZED HEALTH CARE EDUCATION/TRAINING PROGRAM

## 2020-08-05 PROCEDURE — 3074F PR MOST RECENT SYSTOLIC BLOOD PRESSURE < 130 MM HG: ICD-10-PCS | Mod: CPTII,S$GLB,, | Performed by: STUDENT IN AN ORGANIZED HEALTH CARE EDUCATION/TRAINING PROGRAM

## 2020-08-05 RX ORDER — MELOXICAM 15 MG/1
15 TABLET ORAL DAILY
Qty: 7 TABLET | Refills: 0 | Status: SHIPPED | OUTPATIENT
Start: 2020-08-05 | End: 2020-08-12

## 2020-08-05 NOTE — PROGRESS NOTES
Subjective:       Patient ID: Marlyn Kim is a 34 y.o. female who presents for Hand Pain      HPI : Marlyn Kim is a 34 y.o. female with past medical history of HTN, DM, obesity who presents for urgent care evaluation of R thumb pain. Patient states the pain started about 3-4 days ago at the base of her thumb and progressively worsened over that timeframe and partially extended up her thumb. She says the joint is very painful and hurts with movement, and has also noted swelling, but denies erythema. She denies preceding trauma. She states this has happened once before about a couple months ago and it went away on its own. She has no other problems with joints or other pain. She denies fever / chills, other joint stiffness, nausea / vomiting, shortness of breath, abdominal pain. She states she has tried tylenol and advil otc and the advil briefly helped but the pain came back. She works as a tech. She has a history of arthritis in her mother and sister and gout in her mother.     Review of Systems   Constitutional: Negative for chills, fever and malaise/fatigue.   HENT: Negative for congestion and sore throat.    Respiratory: Negative for cough and shortness of breath.    Cardiovascular: Negative for chest pain and leg swelling.   Gastrointestinal: Negative for abdominal pain, nausea and vomiting.   Genitourinary: Negative for dysuria.   Musculoskeletal: Positive for joint pain. Negative for back pain and myalgias.   Skin: Negative for itching and rash.   Neurological: Negative for dizziness, focal weakness and headaches.       Past Medical History:   Diagnosis Date    BMI 45.0-49.9, adult     Diabetes mellitus     Hypertension     Obesity        Past Surgical History:   Procedure Laterality Date     SECTION      CHOLECYSTECTOMY      TUBAL LIGATION Bilateral 2015       Objective:     Vitals:    20 1315   BP: 124/86   Pulse: 80   SpO2: 99%   Weight: 123.8 kg (272 lb 14.9 oz)          Physical Exam   Constitutional: She is well-developed, well-nourished, and in no distress. No distress.   HENT:   Head: Normocephalic and atraumatic.   Eyes: Conjunctivae are normal.   Neck: Normal range of motion. Neck supple.   Cardiovascular: Normal rate, regular rhythm and normal heart sounds.   Pulmonary/Chest: Effort normal and breath sounds normal. No respiratory distress.   Abdominal: Soft. She exhibits no distension. There is no abdominal tenderness.   Musculoskeletal:         General: Tenderness and edema present.      Comments: Tenderness R thumb MCP, PIP, reduced ROM 2/2 pain  Swelling R MCP   Neurological: She is alert.   Skin: Skin is warm and dry. She is not diaphoretic. No erythema.          Assessment / Plan:       Marlyn MAURICIO was seen today for hand pain.    Diagnoses and all orders for this visit:    Thumb pain, right  -     X-Ray Hand 3 View Right; Future  -     Uric acid; Future    Other orders  -     meloxicam (MOBIC) 15 MG tablet; Take 1 tablet (15 mg total) by mouth once daily. for 7 days    No preceding trauma  Low suspicion for RA given clinical history despite family history, low suspicion septic joint, possible OA vs. gout  F/u XR, uric acid  Mobic for 7 day course    Case discussed with Staff, Dr. France    RTC PRN    Rui Monahan MD  Internal Medicine, PGY3

## 2020-08-06 ENCOUNTER — PATIENT MESSAGE (OUTPATIENT)
Dept: NEPHROLOGY | Facility: CLINIC | Age: 35
End: 2020-08-06

## 2020-08-06 ENCOUNTER — PATIENT MESSAGE (OUTPATIENT)
Dept: INTERNAL MEDICINE | Facility: CLINIC | Age: 35
End: 2020-08-06

## 2020-08-06 DIAGNOSIS — M10.9 ACUTE GOUT OF RIGHT HAND, UNSPECIFIED CAUSE: Primary | ICD-10-CM

## 2020-08-06 NOTE — TELEPHONE ENCOUNTER
Uric acid elevated 7.1, to complete NSAID course, will start allopurinol two weeks after flare resolution for second flare in couple months.

## 2020-08-14 RX ORDER — ALLOPURINOL 100 MG/1
100 TABLET ORAL DAILY
Qty: 60 TABLET | Refills: 2 | Status: SHIPPED | OUTPATIENT
Start: 2020-08-14 | End: 2021-02-11 | Stop reason: SDUPTHER

## 2020-08-19 DIAGNOSIS — Z01.84 ANTIBODY RESPONSE EXAMINATION: ICD-10-CM

## 2020-09-18 DIAGNOSIS — Z01.84 ANTIBODY RESPONSE EXAMINATION: ICD-10-CM

## 2020-10-08 ENCOUNTER — LAB VISIT (OUTPATIENT)
Dept: LAB | Facility: OTHER | Age: 35
End: 2020-10-08
Payer: MEDICAID

## 2020-10-08 DIAGNOSIS — Z03.818 ENCOUNTER FOR OBSERVATION FOR SUSPECTED EXPOSURE TO OTHER BIOLOGICAL AGENTS RULED OUT: ICD-10-CM

## 2020-10-08 PROCEDURE — U0003 INFECTIOUS AGENT DETECTION BY NUCLEIC ACID (DNA OR RNA); SEVERE ACUTE RESPIRATORY SYNDROME CORONAVIRUS 2 (SARS-COV-2) (CORONAVIRUS DISEASE [COVID-19]), AMPLIFIED PROBE TECHNIQUE, MAKING USE OF HIGH THROUGHPUT TECHNOLOGIES AS DESCRIBED BY CMS-2020-01-R: HCPCS

## 2020-10-09 LAB — SARS-COV-2 RNA RESP QL NAA+PROBE: NOT DETECTED

## 2020-10-18 DIAGNOSIS — Z01.84 ANTIBODY RESPONSE EXAMINATION: ICD-10-CM

## 2020-11-17 DIAGNOSIS — Z01.84 ANTIBODY RESPONSE EXAMINATION: ICD-10-CM

## 2020-11-19 ENCOUNTER — LAB VISIT (OUTPATIENT)
Dept: LAB | Facility: OTHER | Age: 35
End: 2020-11-19
Attending: INTERNAL MEDICINE
Payer: MEDICAID

## 2020-11-19 DIAGNOSIS — Z03.818 ENCOUNTER FOR OBSERVATION FOR SUSPECTED EXPOSURE TO OTHER BIOLOGICAL AGENTS RULED OUT: ICD-10-CM

## 2020-11-19 PROCEDURE — U0003 INFECTIOUS AGENT DETECTION BY NUCLEIC ACID (DNA OR RNA); SEVERE ACUTE RESPIRATORY SYNDROME CORONAVIRUS 2 (SARS-COV-2) (CORONAVIRUS DISEASE [COVID-19]), AMPLIFIED PROBE TECHNIQUE, MAKING USE OF HIGH THROUGHPUT TECHNOLOGIES AS DESCRIBED BY CMS-2020-01-R: HCPCS

## 2020-11-22 LAB — SARS-COV-2 RNA RESP QL NAA+PROBE: NOT DETECTED

## 2020-11-25 ENCOUNTER — LAB VISIT (OUTPATIENT)
Dept: LAB | Facility: OTHER | Age: 35
End: 2020-11-25
Payer: MEDICAID

## 2020-11-25 DIAGNOSIS — Z03.818 ENCOUNTER FOR OBSERVATION FOR SUSPECTED EXPOSURE TO OTHER BIOLOGICAL AGENTS RULED OUT: ICD-10-CM

## 2020-11-25 PROCEDURE — U0003 INFECTIOUS AGENT DETECTION BY NUCLEIC ACID (DNA OR RNA); SEVERE ACUTE RESPIRATORY SYNDROME CORONAVIRUS 2 (SARS-COV-2) (CORONAVIRUS DISEASE [COVID-19]), AMPLIFIED PROBE TECHNIQUE, MAKING USE OF HIGH THROUGHPUT TECHNOLOGIES AS DESCRIBED BY CMS-2020-01-R: HCPCS

## 2020-11-27 ENCOUNTER — LAB VISIT (OUTPATIENT)
Dept: LAB | Facility: OTHER | Age: 35
End: 2020-11-27
Payer: MEDICAID

## 2020-11-27 DIAGNOSIS — Z03.818 ENCOUNTER FOR OBSERVATION FOR SUSPECTED EXPOSURE TO OTHER BIOLOGICAL AGENTS RULED OUT: ICD-10-CM

## 2020-11-27 PROCEDURE — U0003 INFECTIOUS AGENT DETECTION BY NUCLEIC ACID (DNA OR RNA); SEVERE ACUTE RESPIRATORY SYNDROME CORONAVIRUS 2 (SARS-COV-2) (CORONAVIRUS DISEASE [COVID-19]), AMPLIFIED PROBE TECHNIQUE, MAKING USE OF HIGH THROUGHPUT TECHNOLOGIES AS DESCRIBED BY CMS-2020-01-R: HCPCS

## 2020-11-28 LAB — SARS-COV-2 RNA RESP QL NAA+PROBE: NOT DETECTED

## 2020-11-29 LAB — SARS-COV-2 RNA RESP QL NAA+PROBE: NOT DETECTED

## 2020-12-03 ENCOUNTER — LAB VISIT (OUTPATIENT)
Dept: LAB | Facility: OTHER | Age: 35
End: 2020-12-03
Payer: MEDICAID

## 2020-12-03 DIAGNOSIS — Z03.818 ENCOUNTER FOR OBSERVATION FOR SUSPECTED EXPOSURE TO OTHER BIOLOGICAL AGENTS RULED OUT: ICD-10-CM

## 2020-12-03 PROCEDURE — U0003 INFECTIOUS AGENT DETECTION BY NUCLEIC ACID (DNA OR RNA); SEVERE ACUTE RESPIRATORY SYNDROME CORONAVIRUS 2 (SARS-COV-2) (CORONAVIRUS DISEASE [COVID-19]), AMPLIFIED PROBE TECHNIQUE, MAKING USE OF HIGH THROUGHPUT TECHNOLOGIES AS DESCRIBED BY CMS-2020-01-R: HCPCS

## 2020-12-04 LAB — SARS-COV-2 RNA RESP QL NAA+PROBE: NOT DETECTED

## 2021-01-29 ENCOUNTER — LAB VISIT (OUTPATIENT)
Dept: LAB | Facility: OTHER | Age: 36
End: 2021-01-29
Payer: MEDICAID

## 2021-01-29 DIAGNOSIS — Z20.822 ENCOUNTER FOR LABORATORY TESTING FOR COVID-19 VIRUS: ICD-10-CM

## 2021-01-29 PROCEDURE — U0003 INFECTIOUS AGENT DETECTION BY NUCLEIC ACID (DNA OR RNA); SEVERE ACUTE RESPIRATORY SYNDROME CORONAVIRUS 2 (SARS-COV-2) (CORONAVIRUS DISEASE [COVID-19]), AMPLIFIED PROBE TECHNIQUE, MAKING USE OF HIGH THROUGHPUT TECHNOLOGIES AS DESCRIBED BY CMS-2020-01-R: HCPCS

## 2021-01-30 LAB — SARS-COV-2 RNA RESP QL NAA+PROBE: NOT DETECTED

## 2021-02-11 ENCOUNTER — LAB VISIT (OUTPATIENT)
Dept: LAB | Facility: HOSPITAL | Age: 36
End: 2021-02-11
Payer: MEDICAID

## 2021-02-11 ENCOUNTER — TELEPHONE (OUTPATIENT)
Dept: INTERNAL MEDICINE | Facility: CLINIC | Age: 36
End: 2021-02-11

## 2021-02-11 ENCOUNTER — PATIENT MESSAGE (OUTPATIENT)
Dept: ENDOCRINOLOGY | Facility: CLINIC | Age: 36
End: 2021-02-11

## 2021-02-11 ENCOUNTER — OFFICE VISIT (OUTPATIENT)
Dept: INTERNAL MEDICINE | Facility: CLINIC | Age: 36
End: 2021-02-11
Payer: MEDICAID

## 2021-02-11 VITALS
HEART RATE: 69 BPM | OXYGEN SATURATION: 98 % | HEIGHT: 65 IN | SYSTOLIC BLOOD PRESSURE: 132 MMHG | WEIGHT: 278 LBS | BODY MASS INDEX: 46.32 KG/M2 | DIASTOLIC BLOOD PRESSURE: 80 MMHG

## 2021-02-11 DIAGNOSIS — E11.8 TYPE 2 DIABETES MELLITUS WITH COMPLICATION, WITH LONG-TERM CURRENT USE OF INSULIN: ICD-10-CM

## 2021-02-11 DIAGNOSIS — Z00.00 HEALTH MAINTENANCE EXAMINATION: Primary | ICD-10-CM

## 2021-02-11 DIAGNOSIS — Z00.00 HEALTH MAINTENANCE EXAMINATION: ICD-10-CM

## 2021-02-11 DIAGNOSIS — I10 ESSENTIAL HYPERTENSION: ICD-10-CM

## 2021-02-11 DIAGNOSIS — Z79.4 TYPE 2 DIABETES MELLITUS WITH COMPLICATION, WITH LONG-TERM CURRENT USE OF INSULIN: ICD-10-CM

## 2021-02-11 DIAGNOSIS — E11.65 UNCONTROLLED TYPE 2 DIABETES MELLITUS WITH HYPERGLYCEMIA: ICD-10-CM

## 2021-02-11 DIAGNOSIS — E11.65 TYPE 2 DIABETES MELLITUS WITH HYPERGLYCEMIA, WITHOUT LONG-TERM CURRENT USE OF INSULIN: ICD-10-CM

## 2021-02-11 LAB
ALBUMIN SERPL BCP-MCNC: 3.5 G/DL (ref 3.5–5.2)
ALBUMIN/CREAT UR: 9 UG/MG (ref 0–30)
ALP SERPL-CCNC: 113 U/L (ref 55–135)
ALT SERPL W/O P-5'-P-CCNC: 18 U/L (ref 10–44)
ANION GAP SERPL CALC-SCNC: 11 MMOL/L (ref 8–16)
AST SERPL-CCNC: 15 U/L (ref 10–40)
BASOPHILS # BLD AUTO: 0.04 K/UL (ref 0–0.2)
BASOPHILS NFR BLD: 0.6 % (ref 0–1.9)
BILIRUB SERPL-MCNC: 0.6 MG/DL (ref 0.1–1)
BUN SERPL-MCNC: 15 MG/DL (ref 6–20)
CALCIUM SERPL-MCNC: 9.7 MG/DL (ref 8.7–10.5)
CHLORIDE SERPL-SCNC: 105 MMOL/L (ref 95–110)
CHOLEST SERPL-MCNC: 198 MG/DL (ref 120–199)
CHOLEST/HDLC SERPL: 5 {RATIO} (ref 2–5)
CO2 SERPL-SCNC: 24 MMOL/L (ref 23–29)
CREAT SERPL-MCNC: 0.8 MG/DL (ref 0.5–1.4)
CREAT UR-MCNC: 67 MG/DL (ref 15–325)
DIFFERENTIAL METHOD: ABNORMAL
EOSINOPHIL # BLD AUTO: 0.2 K/UL (ref 0–0.5)
EOSINOPHIL NFR BLD: 2.2 % (ref 0–8)
ERYTHROCYTE [DISTWIDTH] IN BLOOD BY AUTOMATED COUNT: 13.2 % (ref 11.5–14.5)
EST. GFR  (AFRICAN AMERICAN): >60 ML/MIN/1.73 M^2
EST. GFR  (NON AFRICAN AMERICAN): >60 ML/MIN/1.73 M^2
ESTIMATED AVG GLUCOSE: 177 MG/DL (ref 68–131)
GLUCOSE SERPL-MCNC: 167 MG/DL (ref 70–110)
HBA1C MFR BLD: 7.8 % (ref 4–5.6)
HCT VFR BLD AUTO: 42.7 % (ref 37–48.5)
HDLC SERPL-MCNC: 40 MG/DL (ref 40–75)
HDLC SERPL: 20.2 % (ref 20–50)
HGB BLD-MCNC: 13.6 G/DL (ref 12–16)
IMM GRANULOCYTES # BLD AUTO: 0.02 K/UL (ref 0–0.04)
IMM GRANULOCYTES NFR BLD AUTO: 0.3 % (ref 0–0.5)
LDLC SERPL CALC-MCNC: 123.8 MG/DL (ref 63–159)
LYMPHOCYTES # BLD AUTO: 2.1 K/UL (ref 1–4.8)
LYMPHOCYTES NFR BLD: 29.2 % (ref 18–48)
MCH RBC QN AUTO: 26.5 PG (ref 27–31)
MCHC RBC AUTO-ENTMCNC: 31.9 G/DL (ref 32–36)
MCV RBC AUTO: 83 FL (ref 82–98)
MICROALBUMIN UR DL<=1MG/L-MCNC: 6 UG/ML
MONOCYTES # BLD AUTO: 0.4 K/UL (ref 0.3–1)
MONOCYTES NFR BLD: 6.2 % (ref 4–15)
NEUTROPHILS # BLD AUTO: 4.4 K/UL (ref 1.8–7.7)
NEUTROPHILS NFR BLD: 61.5 % (ref 38–73)
NONHDLC SERPL-MCNC: 158 MG/DL
NRBC BLD-RTO: 0 /100 WBC
PLATELET # BLD AUTO: 333 K/UL (ref 150–350)
PMV BLD AUTO: 10.5 FL (ref 9.2–12.9)
POTASSIUM SERPL-SCNC: 4.1 MMOL/L (ref 3.5–5.1)
PROT SERPL-MCNC: 7.6 G/DL (ref 6–8.4)
RBC # BLD AUTO: 5.13 M/UL (ref 4–5.4)
SODIUM SERPL-SCNC: 140 MMOL/L (ref 136–145)
TRIGL SERPL-MCNC: 171 MG/DL (ref 30–150)
WBC # BLD AUTO: 7.13 K/UL (ref 3.9–12.7)

## 2021-02-11 PROCEDURE — 86703 HIV-1/HIV-2 1 RESULT ANTBDY: CPT

## 2021-02-11 PROCEDURE — 99214 OFFICE O/P EST MOD 30 MIN: CPT | Mod: S$PBB,,, | Performed by: STUDENT IN AN ORGANIZED HEALTH CARE EDUCATION/TRAINING PROGRAM

## 2021-02-11 PROCEDURE — 99999 PR PBB SHADOW E&M-EST. PATIENT-LVL IV: CPT | Mod: PBBFAC,,, | Performed by: STUDENT IN AN ORGANIZED HEALTH CARE EDUCATION/TRAINING PROGRAM

## 2021-02-11 PROCEDURE — 86803 HEPATITIS C AB TEST: CPT

## 2021-02-11 PROCEDURE — 99214 OFFICE O/P EST MOD 30 MIN: CPT | Mod: PBBFAC | Performed by: STUDENT IN AN ORGANIZED HEALTH CARE EDUCATION/TRAINING PROGRAM

## 2021-02-11 PROCEDURE — 80053 COMPREHEN METABOLIC PANEL: CPT

## 2021-02-11 PROCEDURE — 99214 PR OFFICE/OUTPT VISIT, EST, LEVL IV, 30-39 MIN: ICD-10-PCS | Mod: S$PBB,,, | Performed by: STUDENT IN AN ORGANIZED HEALTH CARE EDUCATION/TRAINING PROGRAM

## 2021-02-11 PROCEDURE — 36415 COLL VENOUS BLD VENIPUNCTURE: CPT

## 2021-02-11 PROCEDURE — 99999 PR PBB SHADOW E&M-EST. PATIENT-LVL IV: ICD-10-PCS | Mod: PBBFAC,,, | Performed by: STUDENT IN AN ORGANIZED HEALTH CARE EDUCATION/TRAINING PROGRAM

## 2021-02-11 PROCEDURE — 83036 HEMOGLOBIN GLYCOSYLATED A1C: CPT

## 2021-02-11 PROCEDURE — 82043 UR ALBUMIN QUANTITATIVE: CPT

## 2021-02-11 PROCEDURE — 85025 COMPLETE CBC W/AUTO DIFF WBC: CPT

## 2021-02-11 PROCEDURE — 82570 ASSAY OF URINE CREATININE: CPT

## 2021-02-11 PROCEDURE — 80061 LIPID PANEL: CPT

## 2021-02-11 RX ORDER — AMLODIPINE BESYLATE 10 MG/1
TABLET ORAL
Qty: 90 TABLET | Refills: 6 | Status: SHIPPED | OUTPATIENT
Start: 2021-02-11

## 2021-02-11 RX ORDER — EMPAGLIFLOZIN 10 MG/1
10 TABLET, FILM COATED ORAL DAILY
Qty: 30 TABLET | Refills: 4 | Status: SHIPPED | OUTPATIENT
Start: 2021-02-11 | End: 2021-09-27 | Stop reason: SDUPTHER

## 2021-02-11 RX ORDER — LANCETS 28 GAUGE
EACH MISCELLANEOUS
Qty: 100 EACH | Refills: 6 | Status: SHIPPED | OUTPATIENT
Start: 2021-02-11

## 2021-02-11 RX ORDER — LOSARTAN POTASSIUM 50 MG/1
50 TABLET ORAL DAILY
Qty: 90 TABLET | Refills: 6 | Status: SHIPPED | OUTPATIENT
Start: 2021-02-11

## 2021-02-11 RX ORDER — METFORMIN HYDROCHLORIDE 500 MG/1
1000 TABLET, EXTENDED RELEASE ORAL DAILY
Qty: 180 TABLET | Refills: 11 | Status: SHIPPED | OUTPATIENT
Start: 2021-02-11

## 2021-02-11 RX ORDER — INSULIN PUMP SYRINGE, 3 ML
EACH MISCELLANEOUS
Qty: 1 EACH | Refills: 0 | Status: SHIPPED | OUTPATIENT
Start: 2021-02-11 | End: 2022-02-11

## 2021-02-11 RX ORDER — ALLOPURINOL 100 MG/1
100 TABLET ORAL DAILY
Qty: 60 TABLET | Refills: 2 | Status: SHIPPED | OUTPATIENT
Start: 2021-02-11

## 2021-02-12 ENCOUNTER — LAB VISIT (OUTPATIENT)
Dept: LAB | Facility: OTHER | Age: 36
End: 2021-02-12
Attending: INTERNAL MEDICINE
Payer: MEDICAID

## 2021-02-12 ENCOUNTER — PATIENT MESSAGE (OUTPATIENT)
Dept: ENDOCRINOLOGY | Facility: CLINIC | Age: 36
End: 2021-02-12

## 2021-02-12 DIAGNOSIS — Z20.822 ENCOUNTER FOR LABORATORY TESTING FOR COVID-19 VIRUS: ICD-10-CM

## 2021-02-12 LAB
HCV AB SERPL QL IA: NEGATIVE
HIV 1+2 AB+HIV1 P24 AG SERPL QL IA: NEGATIVE

## 2021-02-12 PROCEDURE — U0003 INFECTIOUS AGENT DETECTION BY NUCLEIC ACID (DNA OR RNA); SEVERE ACUTE RESPIRATORY SYNDROME CORONAVIRUS 2 (SARS-COV-2) (CORONAVIRUS DISEASE [COVID-19]), AMPLIFIED PROBE TECHNIQUE, MAKING USE OF HIGH THROUGHPUT TECHNOLOGIES AS DESCRIBED BY CMS-2020-01-R: HCPCS

## 2021-02-13 LAB — SARS-COV-2 RNA RESP QL NAA+PROBE: NOT DETECTED

## 2021-02-26 ENCOUNTER — TELEPHONE (OUTPATIENT)
Dept: INTERNAL MEDICINE | Facility: CLINIC | Age: 36
End: 2021-02-26

## 2021-02-26 ENCOUNTER — LAB VISIT (OUTPATIENT)
Dept: LAB | Facility: OTHER | Age: 36
End: 2021-02-26
Payer: MEDICAID

## 2021-02-26 DIAGNOSIS — Z20.822 ENCOUNTER FOR LABORATORY TESTING FOR COVID-19 VIRUS: ICD-10-CM

## 2021-02-26 PROCEDURE — U0003 INFECTIOUS AGENT DETECTION BY NUCLEIC ACID (DNA OR RNA); SEVERE ACUTE RESPIRATORY SYNDROME CORONAVIRUS 2 (SARS-COV-2) (CORONAVIRUS DISEASE [COVID-19]), AMPLIFIED PROBE TECHNIQUE, MAKING USE OF HIGH THROUGHPUT TECHNOLOGIES AS DESCRIBED BY CMS-2020-01-R: HCPCS

## 2021-02-27 LAB — SARS-COV-2 RNA RESP QL NAA+PROBE: NOT DETECTED

## 2021-03-12 ENCOUNTER — LAB VISIT (OUTPATIENT)
Dept: LAB | Facility: OTHER | Age: 36
End: 2021-03-12
Payer: MEDICAID

## 2021-03-12 DIAGNOSIS — Z20.822 ENCOUNTER FOR LABORATORY TESTING FOR COVID-19 VIRUS: ICD-10-CM

## 2021-03-12 PROCEDURE — U0003 INFECTIOUS AGENT DETECTION BY NUCLEIC ACID (DNA OR RNA); SEVERE ACUTE RESPIRATORY SYNDROME CORONAVIRUS 2 (SARS-COV-2) (CORONAVIRUS DISEASE [COVID-19]), AMPLIFIED PROBE TECHNIQUE, MAKING USE OF HIGH THROUGHPUT TECHNOLOGIES AS DESCRIBED BY CMS-2020-01-R: HCPCS | Performed by: NURSE PRACTITIONER

## 2021-03-13 LAB — SARS-COV-2 RNA RESP QL NAA+PROBE: NOT DETECTED

## 2021-03-15 ENCOUNTER — PATIENT MESSAGE (OUTPATIENT)
Dept: INTERNAL MEDICINE | Facility: CLINIC | Age: 36
End: 2021-03-15

## 2021-03-16 ENCOUNTER — PATIENT MESSAGE (OUTPATIENT)
Dept: INTERNAL MEDICINE | Facility: CLINIC | Age: 36
End: 2021-03-16

## 2021-03-17 RX ORDER — FLUCONAZOLE 150 MG/1
150 TABLET ORAL DAILY
Qty: 1 TABLET | Refills: 0 | Status: SHIPPED | OUTPATIENT
Start: 2021-03-17 | End: 2021-03-18

## 2021-03-19 ENCOUNTER — LAB VISIT (OUTPATIENT)
Dept: LAB | Facility: OTHER | Age: 36
End: 2021-03-19
Payer: MEDICAID

## 2021-03-19 DIAGNOSIS — Z20.822 ENCOUNTER FOR LABORATORY TESTING FOR COVID-19 VIRUS: ICD-10-CM

## 2021-03-19 PROCEDURE — U0003 INFECTIOUS AGENT DETECTION BY NUCLEIC ACID (DNA OR RNA); SEVERE ACUTE RESPIRATORY SYNDROME CORONAVIRUS 2 (SARS-COV-2) (CORONAVIRUS DISEASE [COVID-19]), AMPLIFIED PROBE TECHNIQUE, MAKING USE OF HIGH THROUGHPUT TECHNOLOGIES AS DESCRIBED BY CMS-2020-01-R: HCPCS | Performed by: NURSE PRACTITIONER

## 2021-03-20 LAB — SARS-COV-2 RNA RESP QL NAA+PROBE: NOT DETECTED

## 2021-04-15 ENCOUNTER — PATIENT MESSAGE (OUTPATIENT)
Dept: RESEARCH | Facility: HOSPITAL | Age: 36
End: 2021-04-15

## 2021-04-21 ENCOUNTER — TELEPHONE (OUTPATIENT)
Dept: ADMINISTRATIVE | Facility: HOSPITAL | Age: 36
End: 2021-04-21

## 2021-04-23 ENCOUNTER — LAB VISIT (OUTPATIENT)
Dept: LAB | Facility: OTHER | Age: 36
End: 2021-04-23
Payer: MEDICAID

## 2021-04-23 DIAGNOSIS — Z20.822 ENCOUNTER FOR LABORATORY TESTING FOR COVID-19 VIRUS: ICD-10-CM

## 2021-04-23 PROCEDURE — U0003 INFECTIOUS AGENT DETECTION BY NUCLEIC ACID (DNA OR RNA); SEVERE ACUTE RESPIRATORY SYNDROME CORONAVIRUS 2 (SARS-COV-2) (CORONAVIRUS DISEASE [COVID-19]), AMPLIFIED PROBE TECHNIQUE, MAKING USE OF HIGH THROUGHPUT TECHNOLOGIES AS DESCRIBED BY CMS-2020-01-R: HCPCS | Performed by: NURSE PRACTITIONER

## 2021-04-24 LAB — SARS-COV-2 RNA RESP QL NAA+PROBE: NOT DETECTED

## 2021-05-14 ENCOUNTER — LAB VISIT (OUTPATIENT)
Dept: LAB | Facility: OTHER | Age: 36
End: 2021-05-14
Payer: MEDICAID

## 2021-05-14 DIAGNOSIS — Z20.822 ENCOUNTER FOR LABORATORY TESTING FOR COVID-19 VIRUS: ICD-10-CM

## 2021-05-14 PROCEDURE — U0003 INFECTIOUS AGENT DETECTION BY NUCLEIC ACID (DNA OR RNA); SEVERE ACUTE RESPIRATORY SYNDROME CORONAVIRUS 2 (SARS-COV-2) (CORONAVIRUS DISEASE [COVID-19]), AMPLIFIED PROBE TECHNIQUE, MAKING USE OF HIGH THROUGHPUT TECHNOLOGIES AS DESCRIBED BY CMS-2020-01-R: HCPCS | Performed by: NURSE PRACTITIONER

## 2021-05-15 LAB — SARS-COV-2 RNA RESP QL NAA+PROBE: NOT DETECTED

## 2021-07-02 ENCOUNTER — LAB VISIT (OUTPATIENT)
Dept: LAB | Facility: OTHER | Age: 36
End: 2021-07-02
Payer: MEDICAID

## 2021-07-02 DIAGNOSIS — Z20.822 ENCOUNTER FOR LABORATORY TESTING FOR COVID-19 VIRUS: ICD-10-CM

## 2021-07-02 LAB — SARS-COV-2 RNA RESP QL NAA+PROBE: NOT DETECTED

## 2021-07-02 PROCEDURE — U0003 INFECTIOUS AGENT DETECTION BY NUCLEIC ACID (DNA OR RNA); SEVERE ACUTE RESPIRATORY SYNDROME CORONAVIRUS 2 (SARS-COV-2) (CORONAVIRUS DISEASE [COVID-19]), AMPLIFIED PROBE TECHNIQUE, MAKING USE OF HIGH THROUGHPUT TECHNOLOGIES AS DESCRIBED BY CMS-2020-01-R: HCPCS | Performed by: NURSE PRACTITIONER

## 2021-07-06 ENCOUNTER — OFFICE VISIT (OUTPATIENT)
Dept: INTERNAL MEDICINE | Facility: CLINIC | Age: 36
End: 2021-07-06
Payer: MEDICAID

## 2021-07-06 VITALS
HEART RATE: 63 BPM | SYSTOLIC BLOOD PRESSURE: 130 MMHG | DIASTOLIC BLOOD PRESSURE: 85 MMHG | BODY MASS INDEX: 46.73 KG/M2 | OXYGEN SATURATION: 98 % | HEIGHT: 65 IN | WEIGHT: 280.44 LBS

## 2021-07-06 DIAGNOSIS — B37.9 CANDIDA INFECTION: Primary | ICD-10-CM

## 2021-07-06 PROCEDURE — 87491 CHLMYD TRACH DNA AMP PROBE: CPT | Performed by: STUDENT IN AN ORGANIZED HEALTH CARE EDUCATION/TRAINING PROGRAM

## 2021-07-06 PROCEDURE — 99213 PR OFFICE/OUTPT VISIT, EST, LEVL III, 20-29 MIN: ICD-10-PCS | Mod: S$PBB,,, | Performed by: STUDENT IN AN ORGANIZED HEALTH CARE EDUCATION/TRAINING PROGRAM

## 2021-07-06 PROCEDURE — 99213 OFFICE O/P EST LOW 20 MIN: CPT | Mod: S$PBB,,, | Performed by: STUDENT IN AN ORGANIZED HEALTH CARE EDUCATION/TRAINING PROGRAM

## 2021-07-06 PROCEDURE — 87591 N.GONORRHOEAE DNA AMP PROB: CPT | Performed by: STUDENT IN AN ORGANIZED HEALTH CARE EDUCATION/TRAINING PROGRAM

## 2021-07-06 PROCEDURE — 99999 PR PBB SHADOW E&M-EST. PATIENT-LVL III: ICD-10-PCS | Mod: PBBFAC,,, | Performed by: STUDENT IN AN ORGANIZED HEALTH CARE EDUCATION/TRAINING PROGRAM

## 2021-07-06 PROCEDURE — 99213 OFFICE O/P EST LOW 20 MIN: CPT | Mod: PBBFAC | Performed by: STUDENT IN AN ORGANIZED HEALTH CARE EDUCATION/TRAINING PROGRAM

## 2021-07-06 PROCEDURE — 99999 PR PBB SHADOW E&M-EST. PATIENT-LVL III: CPT | Mod: PBBFAC,,, | Performed by: STUDENT IN AN ORGANIZED HEALTH CARE EDUCATION/TRAINING PROGRAM

## 2021-07-06 RX ORDER — FLUCONAZOLE 150 MG/1
150 TABLET ORAL DAILY
Qty: 1 TABLET | Refills: 0 | Status: SHIPPED | OUTPATIENT
Start: 2021-07-06 | End: 2021-07-07

## 2021-07-08 ENCOUNTER — PATIENT MESSAGE (OUTPATIENT)
Dept: INTERNAL MEDICINE | Facility: CLINIC | Age: 36
End: 2021-07-08

## 2021-07-08 LAB
C TRACH DNA SPEC QL NAA+PROBE: NOT DETECTED
N GONORRHOEA DNA SPEC QL NAA+PROBE: NOT DETECTED

## 2021-08-06 ENCOUNTER — LAB VISIT (OUTPATIENT)
Dept: LAB | Facility: OTHER | Age: 36
End: 2021-08-06
Payer: MEDICAID

## 2021-08-06 DIAGNOSIS — Z20.822 ENCOUNTER FOR LABORATORY TESTING FOR COVID-19 VIRUS: ICD-10-CM

## 2021-08-06 PROCEDURE — U0003 INFECTIOUS AGENT DETECTION BY NUCLEIC ACID (DNA OR RNA); SEVERE ACUTE RESPIRATORY SYNDROME CORONAVIRUS 2 (SARS-COV-2) (CORONAVIRUS DISEASE [COVID-19]), AMPLIFIED PROBE TECHNIQUE, MAKING USE OF HIGH THROUGHPUT TECHNOLOGIES AS DESCRIBED BY CMS-2020-01-R: HCPCS | Performed by: NURSE PRACTITIONER

## 2021-08-08 LAB
SARS-COV-2 RNA RESP QL NAA+PROBE: NORMAL
TEST PERFORMANCE INFO SPEC: NORMAL

## 2021-08-20 ENCOUNTER — LAB VISIT (OUTPATIENT)
Dept: LAB | Facility: OTHER | Age: 36
End: 2021-08-20
Payer: MEDICAID

## 2021-08-20 DIAGNOSIS — Z20.822 ENCOUNTER FOR LABORATORY TESTING FOR COVID-19 VIRUS: ICD-10-CM

## 2021-08-20 PROCEDURE — U0003 INFECTIOUS AGENT DETECTION BY NUCLEIC ACID (DNA OR RNA); SEVERE ACUTE RESPIRATORY SYNDROME CORONAVIRUS 2 (SARS-COV-2) (CORONAVIRUS DISEASE [COVID-19]), AMPLIFIED PROBE TECHNIQUE, MAKING USE OF HIGH THROUGHPUT TECHNOLOGIES AS DESCRIBED BY CMS-2020-01-R: HCPCS | Performed by: NURSE PRACTITIONER

## 2021-08-23 LAB — SARS-COV-2 RNA RESP QL NAA+PROBE: NOT DETECTED

## 2021-08-27 ENCOUNTER — LAB VISIT (OUTPATIENT)
Dept: LAB | Facility: OTHER | Age: 36
End: 2021-08-27
Payer: MEDICAID

## 2021-08-27 DIAGNOSIS — Z20.822 ENCOUNTER FOR LABORATORY TESTING FOR COVID-19 VIRUS: ICD-10-CM

## 2021-08-27 PROCEDURE — U0003 INFECTIOUS AGENT DETECTION BY NUCLEIC ACID (DNA OR RNA); SEVERE ACUTE RESPIRATORY SYNDROME CORONAVIRUS 2 (SARS-COV-2) (CORONAVIRUS DISEASE [COVID-19]), AMPLIFIED PROBE TECHNIQUE, MAKING USE OF HIGH THROUGHPUT TECHNOLOGIES AS DESCRIBED BY CMS-2020-01-R: HCPCS | Performed by: NURSE PRACTITIONER

## 2021-08-29 LAB
SARS-COV-2 RNA RESP QL NAA+PROBE: NOT DETECTED
SARS-COV-2- CYCLE NUMBER: NORMAL

## 2021-09-27 DIAGNOSIS — E11.65 UNCONTROLLED TYPE 2 DIABETES MELLITUS WITH HYPERGLYCEMIA: ICD-10-CM

## 2021-09-28 RX ORDER — EMPAGLIFLOZIN 10 MG/1
10 TABLET, FILM COATED ORAL DAILY
Qty: 30 TABLET | Refills: 4 | Status: SHIPPED | OUTPATIENT
Start: 2021-09-28

## 2021-10-08 ENCOUNTER — LAB VISIT (OUTPATIENT)
Dept: LAB | Facility: OTHER | Age: 36
End: 2021-10-08
Payer: MEDICAID

## 2021-10-08 DIAGNOSIS — Z20.822 ENCOUNTER FOR LABORATORY TESTING FOR COVID-19 VIRUS: ICD-10-CM

## 2021-10-08 LAB
SARS-COV-2 RNA RESP QL NAA+PROBE: NOT DETECTED
SARS-COV-2- CYCLE NUMBER: NORMAL

## 2021-10-08 PROCEDURE — U0003 INFECTIOUS AGENT DETECTION BY NUCLEIC ACID (DNA OR RNA); SEVERE ACUTE RESPIRATORY SYNDROME CORONAVIRUS 2 (SARS-COV-2) (CORONAVIRUS DISEASE [COVID-19]), AMPLIFIED PROBE TECHNIQUE, MAKING USE OF HIGH THROUGHPUT TECHNOLOGIES AS DESCRIBED BY CMS-2020-01-R: HCPCS | Performed by: NURSE PRACTITIONER

## 2021-10-22 ENCOUNTER — LAB VISIT (OUTPATIENT)
Dept: LAB | Facility: OTHER | Age: 36
End: 2021-10-22
Payer: MEDICAID

## 2021-10-22 DIAGNOSIS — Z20.822 ENCOUNTER FOR LABORATORY TESTING FOR COVID-19 VIRUS: ICD-10-CM

## 2021-10-22 LAB
SARS-COV-2 RNA RESP QL NAA+PROBE: NOT DETECTED
SARS-COV-2- CYCLE NUMBER: NORMAL

## 2021-10-22 PROCEDURE — U0003 INFECTIOUS AGENT DETECTION BY NUCLEIC ACID (DNA OR RNA); SEVERE ACUTE RESPIRATORY SYNDROME CORONAVIRUS 2 (SARS-COV-2) (CORONAVIRUS DISEASE [COVID-19]), AMPLIFIED PROBE TECHNIQUE, MAKING USE OF HIGH THROUGHPUT TECHNOLOGIES AS DESCRIBED BY CMS-2020-01-R: HCPCS | Performed by: NURSE PRACTITIONER

## 2021-10-29 ENCOUNTER — LAB VISIT (OUTPATIENT)
Dept: LAB | Facility: OTHER | Age: 36
End: 2021-10-29
Payer: MEDICAID

## 2021-10-29 DIAGNOSIS — Z20.822 ENCOUNTER FOR LABORATORY TESTING FOR COVID-19 VIRUS: ICD-10-CM

## 2021-10-29 LAB
SARS-COV-2 RNA RESP QL NAA+PROBE: NOT DETECTED
SARS-COV-2- CYCLE NUMBER: NORMAL

## 2021-10-29 PROCEDURE — U0003 INFECTIOUS AGENT DETECTION BY NUCLEIC ACID (DNA OR RNA); SEVERE ACUTE RESPIRATORY SYNDROME CORONAVIRUS 2 (SARS-COV-2) (CORONAVIRUS DISEASE [COVID-19]), AMPLIFIED PROBE TECHNIQUE, MAKING USE OF HIGH THROUGHPUT TECHNOLOGIES AS DESCRIBED BY CMS-2020-01-R: HCPCS | Performed by: NURSE PRACTITIONER

## 2021-12-03 ENCOUNTER — LAB VISIT (OUTPATIENT)
Dept: LAB | Facility: OTHER | Age: 36
End: 2021-12-03
Payer: MEDICAID

## 2021-12-03 DIAGNOSIS — Z20.822 ENCOUNTER FOR LABORATORY TESTING FOR COVID-19 VIRUS: ICD-10-CM

## 2021-12-03 PROCEDURE — U0003 INFECTIOUS AGENT DETECTION BY NUCLEIC ACID (DNA OR RNA); SEVERE ACUTE RESPIRATORY SYNDROME CORONAVIRUS 2 (SARS-COV-2) (CORONAVIRUS DISEASE [COVID-19]), AMPLIFIED PROBE TECHNIQUE, MAKING USE OF HIGH THROUGHPUT TECHNOLOGIES AS DESCRIBED BY CMS-2020-01-R: HCPCS | Performed by: NURSE PRACTITIONER

## 2021-12-04 LAB
SARS-COV-2 RNA RESP QL NAA+PROBE: NOT DETECTED
SARS-COV-2- CYCLE NUMBER: NORMAL

## 2021-12-17 ENCOUNTER — LAB VISIT (OUTPATIENT)
Dept: LAB | Facility: OTHER | Age: 36
End: 2021-12-17
Payer: MEDICAID

## 2021-12-17 DIAGNOSIS — Z20.822 ENCOUNTER FOR LABORATORY TESTING FOR COVID-19 VIRUS: ICD-10-CM

## 2021-12-17 LAB
SARS-COV-2 RNA RESP QL NAA+PROBE: NOT DETECTED
SARS-COV-2- CYCLE NUMBER: NORMAL

## 2021-12-17 PROCEDURE — U0003 INFECTIOUS AGENT DETECTION BY NUCLEIC ACID (DNA OR RNA); SEVERE ACUTE RESPIRATORY SYNDROME CORONAVIRUS 2 (SARS-COV-2) (CORONAVIRUS DISEASE [COVID-19]), AMPLIFIED PROBE TECHNIQUE, MAKING USE OF HIGH THROUGHPUT TECHNOLOGIES AS DESCRIBED BY CMS-2020-01-R: HCPCS | Performed by: NURSE PRACTITIONER

## 2022-01-04 ENCOUNTER — DOCUMENTATION ONLY (OUTPATIENT)
Dept: BARIATRICS | Facility: CLINIC | Age: 37
End: 2022-01-04
Payer: MEDICAID

## 2022-01-14 ENCOUNTER — LAB VISIT (OUTPATIENT)
Dept: LAB | Facility: OTHER | Age: 37
End: 2022-01-14
Payer: MEDICAID

## 2022-01-14 DIAGNOSIS — Z20.822 ENCOUNTER FOR LABORATORY TESTING FOR COVID-19 VIRUS: ICD-10-CM

## 2022-01-14 PROCEDURE — U0003 INFECTIOUS AGENT DETECTION BY NUCLEIC ACID (DNA OR RNA); SEVERE ACUTE RESPIRATORY SYNDROME CORONAVIRUS 2 (SARS-COV-2) (CORONAVIRUS DISEASE [COVID-19]), AMPLIFIED PROBE TECHNIQUE, MAKING USE OF HIGH THROUGHPUT TECHNOLOGIES AS DESCRIBED BY CMS-2020-01-R: HCPCS | Performed by: NURSE PRACTITIONER

## 2022-01-15 LAB
SARS-COV-2 RNA RESP QL NAA+PROBE: NOT DETECTED
SARS-COV-2- CYCLE NUMBER: NORMAL

## 2022-02-11 ENCOUNTER — LAB VISIT (OUTPATIENT)
Dept: LAB | Facility: OTHER | Age: 37
End: 2022-02-11
Payer: MEDICAID

## 2022-02-11 DIAGNOSIS — Z20.822 ENCOUNTER FOR LABORATORY TESTING FOR COVID-19 VIRUS: ICD-10-CM

## 2022-02-11 LAB
SARS-COV-2 RNA RESP QL NAA+PROBE: NOT DETECTED
SARS-COV-2- CYCLE NUMBER: NORMAL

## 2022-02-11 PROCEDURE — U0003 INFECTIOUS AGENT DETECTION BY NUCLEIC ACID (DNA OR RNA); SEVERE ACUTE RESPIRATORY SYNDROME CORONAVIRUS 2 (SARS-COV-2) (CORONAVIRUS DISEASE [COVID-19]), AMPLIFIED PROBE TECHNIQUE, MAKING USE OF HIGH THROUGHPUT TECHNOLOGIES AS DESCRIBED BY CMS-2020-01-R: HCPCS | Performed by: EMERGENCY MEDICINE

## 2022-03-25 ENCOUNTER — LAB VISIT (OUTPATIENT)
Dept: LAB | Facility: OTHER | Age: 37
End: 2022-03-25
Payer: MEDICAID

## 2022-03-25 DIAGNOSIS — Z20.822 ENCOUNTER FOR LABORATORY TESTING FOR COVID-19 VIRUS: ICD-10-CM

## 2022-03-25 LAB
SARS-COV-2 RNA RESP QL NAA+PROBE: NOT DETECTED
SARS-COV-2- CYCLE NUMBER: NORMAL

## 2022-03-25 PROCEDURE — U0003 INFECTIOUS AGENT DETECTION BY NUCLEIC ACID (DNA OR RNA); SEVERE ACUTE RESPIRATORY SYNDROME CORONAVIRUS 2 (SARS-COV-2) (CORONAVIRUS DISEASE [COVID-19]), AMPLIFIED PROBE TECHNIQUE, MAKING USE OF HIGH THROUGHPUT TECHNOLOGIES AS DESCRIBED BY CMS-2020-01-R: HCPCS | Performed by: EMERGENCY MEDICINE

## 2022-12-07 ENCOUNTER — PATIENT OUTREACH (OUTPATIENT)
Dept: ADMINISTRATIVE | Facility: HOSPITAL | Age: 37
End: 2022-12-07
Payer: MEDICAID

## 2022-12-07 NOTE — PROGRESS NOTES
Patient due for the following    TETANUS VACCINE     Cervical Cancer Screening     COVID-19 Vaccine (3 - Booster for Moderna series)      Patient est care at [facility]
